# Patient Record
Sex: FEMALE | Race: WHITE | NOT HISPANIC OR LATINO | URBAN - METROPOLITAN AREA
[De-identification: names, ages, dates, MRNs, and addresses within clinical notes are randomized per-mention and may not be internally consistent; named-entity substitution may affect disease eponyms.]

---

## 2020-02-17 ENCOUNTER — INPATIENT (INPATIENT)
Facility: HOSPITAL | Age: 30
LOS: 2 days | Discharge: HOME | End: 2020-02-20
Payer: COMMERCIAL

## 2020-02-17 VITALS
TEMPERATURE: 98 F | SYSTOLIC BLOOD PRESSURE: 128 MMHG | DIASTOLIC BLOOD PRESSURE: 78 MMHG | HEART RATE: 63 BPM | WEIGHT: 145.95 LBS | HEIGHT: 63 IN | RESPIRATION RATE: 15 BRPM

## 2020-02-17 DIAGNOSIS — O26.893 OTHER SPECIFIED PREGNANCY RELATED CONDITIONS, THIRD TRIMESTER: ICD-10-CM

## 2020-02-17 DIAGNOSIS — O47.1 FALSE LABOR AT OR AFTER 37 COMPLETED WEEKS OF GESTATION: ICD-10-CM

## 2020-02-17 LAB
ABO RH CONFIRMATION: SIGNIFICANT CHANGE UP
ALLERGY+IMMUNOLOGY DIAG STUDY NOTE: SIGNIFICANT CHANGE UP
BASOPHILS # BLD AUTO: 0.04 K/UL — SIGNIFICANT CHANGE UP (ref 0–0.2)
BASOPHILS NFR BLD AUTO: 0.3 % — SIGNIFICANT CHANGE UP (ref 0–2)
BLD GP AB SCN SERPL QL: SIGNIFICANT CHANGE UP
DIR ANTIGLOB POLYSPECIFIC INTERPRETATION: SIGNIFICANT CHANGE UP
EOSINOPHIL # BLD AUTO: 0.02 K/UL — SIGNIFICANT CHANGE UP (ref 0–0.5)
EOSINOPHIL NFR BLD AUTO: 0.1 % — SIGNIFICANT CHANGE UP (ref 0–6)
HCT VFR BLD CALC: 42.8 % — SIGNIFICANT CHANGE UP (ref 34.5–45)
HGB BLD-MCNC: 14.4 G/DL — SIGNIFICANT CHANGE UP (ref 11.5–15.5)
HIV 1 & 2 AB SERPL IA.RAPID: SIGNIFICANT CHANGE UP
HIV 1+2 AB+HIV1 P24 AG SERPL QL IA: SIGNIFICANT CHANGE UP
IMM GRANULOCYTES NFR BLD AUTO: 0.9 % — SIGNIFICANT CHANGE UP (ref 0–1.5)
LYMPHOCYTES # BLD AUTO: 18.2 % — SIGNIFICANT CHANGE UP (ref 13–44)
LYMPHOCYTES # BLD AUTO: 2.49 K/UL — SIGNIFICANT CHANGE UP (ref 1–3.3)
MCHC RBC-ENTMCNC: 31.7 PG — SIGNIFICANT CHANGE UP (ref 27–34)
MCHC RBC-ENTMCNC: 33.6 GM/DL — SIGNIFICANT CHANGE UP (ref 32–36)
MCV RBC AUTO: 94.3 FL — SIGNIFICANT CHANGE UP (ref 80–100)
MONOCYTES # BLD AUTO: 0.82 K/UL — SIGNIFICANT CHANGE UP (ref 0–0.9)
MONOCYTES NFR BLD AUTO: 6 % — SIGNIFICANT CHANGE UP (ref 2–14)
NEUTROPHILS # BLD AUTO: 10.22 K/UL — HIGH (ref 1.8–7.4)
NEUTROPHILS NFR BLD AUTO: 74.5 % — SIGNIFICANT CHANGE UP (ref 43–77)
PLATELET # BLD AUTO: 186 K/UL — SIGNIFICANT CHANGE UP (ref 150–400)
RBC # BLD: 4.54 M/UL — SIGNIFICANT CHANGE UP (ref 3.8–5.2)
RBC # FLD: 12.1 % — SIGNIFICANT CHANGE UP (ref 10.3–14.5)
T PALLIDUM AB TITR SER: NEGATIVE — SIGNIFICANT CHANGE UP
WBC # BLD: 13.71 K/UL — HIGH (ref 3.8–10.5)
WBC # FLD AUTO: 13.71 K/UL — HIGH (ref 3.8–10.5)

## 2020-02-17 PROCEDURE — 86077 PHYS BLOOD BANK SERV XMATCH: CPT

## 2020-02-17 RX ORDER — SODIUM CHLORIDE 9 MG/ML
1000 INJECTION, SOLUTION INTRAVENOUS
Refills: 0 | Status: DISCONTINUED | OUTPATIENT
Start: 2020-02-17 | End: 2020-02-18

## 2020-02-17 RX ORDER — OXYTOCIN 10 UNIT/ML
2 VIAL (ML) INJECTION
Qty: 30 | Refills: 0 | Status: DISCONTINUED | OUTPATIENT
Start: 2020-02-17 | End: 2020-02-18

## 2020-02-17 RX ORDER — CITRIC ACID/SODIUM CITRATE 300-500 MG
30 SOLUTION, ORAL ORAL ONCE
Refills: 0 | Status: COMPLETED | OUTPATIENT
Start: 2020-02-17 | End: 2020-02-17

## 2020-02-17 RX ORDER — OXYTOCIN 10 UNIT/ML
333.33 VIAL (ML) INJECTION
Qty: 20 | Refills: 0 | Status: COMPLETED | OUTPATIENT
Start: 2020-02-17 | End: 2020-02-17

## 2020-02-17 RX ORDER — OXYTOCIN 10 UNIT/ML
2 VIAL (ML) INJECTION
Qty: 30 | Refills: 0 | Status: DISCONTINUED | OUTPATIENT
Start: 2020-02-17 | End: 2020-02-20

## 2020-02-17 RX ADMIN — Medication 2 MILLIUNIT(S)/MIN: at 10:39

## 2020-02-17 RX ADMIN — SODIUM CHLORIDE 125 MILLILITER(S): 9 INJECTION, SOLUTION INTRAVENOUS at 10:00

## 2020-02-17 RX ADMIN — Medication 2 MILLIUNIT(S)/MIN: at 22:05

## 2020-02-17 NOTE — CHART NOTE - NSCHARTNOTEFT_GEN_A_CORE
Patient seen and examined at bedside.  She is comfortable with her epidural.  No complaints.       Vital Signs Last 24 Hrs  T(C): 36.5 (17 Feb 2020 12:47), Max: 36.8 (17 Feb 2020 09:31)  T(F): 97.7 (17 Feb 2020 12:47), Max: 98.2 (17 Feb 2020 09:31)  HR: 71 (17 Feb 2020 14:58) (63 - 100)  BP: 119/58 (17 Feb 2020 15:02) (107/52 - 137/79)  BP(mean): --  RR: 15 (17 Feb 2020 12:47) (15 - 15)  SpO2: 100% (17 Feb 2020 14:58) (98% - 100%)    FETAL HEART RATE: 120, moderate variability, + accels, no decels    [place]: ctx q 2-4 minutes    Pitocin at 8 milliunits/ min    CERVICAL EXAM: 1/50/-3    Continue current care  Adela Prescott DO

## 2020-02-17 NOTE — CHART NOTE - NSCHARTNOTEFT_GEN_A_CORE
02-17-20 @ 21:34  Patient was evaluated at bedside.  Patient sitting up in bed after an episode of nausea and vomiting   Patient denies any pelvic pressure/discomfort.    ICU Vital Signs Last 24 Hrs  T(C): 37.1 (17 Feb 2020 18:02), Max: 37.1 (17 Feb 2020 18:02)  T(F): 98.78 (17 Feb 2020 18:02), Max: 98.78 (17 Feb 2020 18:02)  HR: 64 (17 Feb 2020 21:33) (54 - 138)  BP: 130/65 (17 Feb 2020 21:27) (107/52 - 147/73)  RR: 15 (17 Feb 2020 16:12) (15 - 15)  SpO2: 100% (17 Feb 2020 21:28) (90% - 100%)    FHT: 125 bpm, periods of minimal variability + accels 3min decel noted   [place]: Ctxs every 2-3 minutes.  SVE: Deferred    PLAN:  3-4 min decelerations with return to baseline after repositioning and O2 supplementation  Pitocin discontinued at bedside; will restart now that tracing is back to baseline  Continuous FHT/[place]  Maternal/fetal status reassuring  Will continue to reassess prn.

## 2020-02-17 NOTE — CHART NOTE - NSCHARTNOTEFT_GEN_A_CORE
Patient seen and examined at bedside.  She is comfortable with epidural.  She did vomit once.  + bloody show.     Vital Signs Last 24 Hrs  T(C): 36.8 (17 Feb 2020 21:56), Max: 37.1 (17 Feb 2020 18:02)  T(F): 98.24 (17 Feb 2020 21:56), Max: 98.78 (17 Feb 2020 18:02)  HR: 59 (17 Feb 2020 22:23) (54 - 138)  BP: 129/66 (17 Feb 2020 22:13) (107/52 - 147/73)  BP(mean): --  RR: 18 (17 Feb 2020 21:56) (15 - 18)  SpO2: 100% (17 Feb 2020 22:23) (90% - 100%)    FETAL HEART RATE: 120, moderate variability, + accels, no decels    [place]: ctx q 2-3 minutes    Pitocin at 6 milliunits/ min    CERVICAL EXAM: 9/100/0    Continue current care  Adela Prescott DO

## 2020-02-17 NOTE — OB PROVIDER H&P - HISTORY OF PRESENT ILLNESS
30y year old G1 at 37w6d  consistent with presents for PROM since this morning.  Pt states that her prenatal course is uncomplicated, denies headaches, abnormal blood pressures, inpatient admissions or major health issues with this pregnancy.  Denies: vaginal bleeding, or contractions.  +fetal movement    Pt reports that she wants epidural anesthesia with for pain control when she feels more uncomfortable    PMH: none  PSH: Back surgery  OBH: regular cycles, no history of STI  Alllergy: NKDA  meds: PNV  Preg complications: none    T(C): 36.8 (20 @ 09:31), Max: 36.8 (20 @ 09:31)  HR: 63 (20 @ 09:31) (63 - 63)  BP: 128/78 (20 @ 09:31) (128/78 - 128/78)  RR: 15 (20 @ 09:31) (15 - 15)  Gen: NAD  Abd: softly distended, gravid, + BS   Pelvic:  0.5cm/20% effaced/-3station grossly ruptured with positive pooling and positive nitrazine  Tracin, moderate variability, + accelerations, - decelerations

## 2020-02-17 NOTE — OB PROVIDER H&P - ASSESSMENT
30y year old G1 at 37w6d  consistent with presents for PROM since this morning.  -admit to L&D  -routine labs  -Pitocin  -IV fluids  -discussed pain management: patient wants epidural   -discussed with Dr. Prescott

## 2020-02-17 NOTE — CHART NOTE - NSCHARTNOTEFT_GEN_A_CORE
Patient seen and examined at bedside.  She is comfortable with her epidural.  No complaints.        Vital Signs Last 24 Hrs  T(C): 36.7 (17 Feb 2020 16:12), Max: 36.8 (17 Feb 2020 09:31)  T(F): 98.06 (17 Feb 2020 16:12), Max: 98.2 (17 Feb 2020 09:31)  HR: 59 (17 Feb 2020 17:58) (54 - 111)  BP: 117/59 (17 Feb 2020 17:58) (107/52 - 147/73)  BP(mean): --  RR: 15 (17 Feb 2020 16:12) (15 - 15)  SpO2: 98% (17 Feb 2020 17:58) (90% - 100%)    FETAL HEART RATE: 135, moderate variability, + accels, no decels    [place]: ctx q 2 minutes    Pitocin at 12 milliunits/ min    CERVICAL EXAM: 3/80/-2    Continue current care  Adela Prescott DO

## 2020-02-17 NOTE — OB PROVIDER H&P - ATTENDING COMMENTS
30 year old female  at 37+ weeks with ROM.  Agree with details of resident note.  VSS.  FHT category 1.  No ctx on toco.  VE: 3.  GBS negative.  Plan for pitocin augmentation.  Pain management as needed. Consent obtained.

## 2020-02-18 RX ORDER — KETOROLAC TROMETHAMINE 30 MG/ML
30 SYRINGE (ML) INJECTION ONCE
Refills: 0 | Status: DISCONTINUED | OUTPATIENT
Start: 2020-02-18 | End: 2020-02-18

## 2020-02-18 RX ORDER — LANOLIN
1 OINTMENT (GRAM) TOPICAL EVERY 6 HOURS
Refills: 0 | Status: DISCONTINUED | OUTPATIENT
Start: 2020-02-18 | End: 2020-02-20

## 2020-02-18 RX ORDER — GLYCERIN ADULT
1 SUPPOSITORY, RECTAL RECTAL AT BEDTIME
Refills: 0 | Status: DISCONTINUED | OUTPATIENT
Start: 2020-02-18 | End: 2020-02-20

## 2020-02-18 RX ORDER — SIMETHICONE 80 MG/1
80 TABLET, CHEWABLE ORAL EVERY 4 HOURS
Refills: 0 | Status: DISCONTINUED | OUTPATIENT
Start: 2020-02-18 | End: 2020-02-20

## 2020-02-18 RX ORDER — MAGNESIUM HYDROXIDE 400 MG/1
30 TABLET, CHEWABLE ORAL
Refills: 0 | Status: DISCONTINUED | OUTPATIENT
Start: 2020-02-18 | End: 2020-02-20

## 2020-02-18 RX ORDER — OXYCODONE HYDROCHLORIDE 5 MG/1
5 TABLET ORAL
Refills: 0 | Status: DISCONTINUED | OUTPATIENT
Start: 2020-02-18 | End: 2020-02-20

## 2020-02-18 RX ORDER — TETANUS TOXOID, REDUCED DIPHTHERIA TOXOID AND ACELLULAR PERTUSSIS VACCINE, ADSORBED 5; 2.5; 8; 8; 2.5 [IU]/.5ML; [IU]/.5ML; UG/.5ML; UG/.5ML; UG/.5ML
0.5 SUSPENSION INTRAMUSCULAR ONCE
Refills: 0 | Status: COMPLETED | OUTPATIENT
Start: 2020-02-18 | End: 2020-02-20

## 2020-02-18 RX ORDER — DIBUCAINE 1 %
1 OINTMENT (GRAM) RECTAL EVERY 6 HOURS
Refills: 0 | Status: DISCONTINUED | OUTPATIENT
Start: 2020-02-18 | End: 2020-02-20

## 2020-02-18 RX ORDER — OXYCODONE HYDROCHLORIDE 5 MG/1
5 TABLET ORAL ONCE
Refills: 0 | Status: DISCONTINUED | OUTPATIENT
Start: 2020-02-18 | End: 2020-02-20

## 2020-02-18 RX ORDER — OXYTOCIN 10 UNIT/ML
333.33 VIAL (ML) INJECTION
Qty: 20 | Refills: 0 | Status: DISCONTINUED | OUTPATIENT
Start: 2020-02-18 | End: 2020-02-20

## 2020-02-18 RX ORDER — BENZOCAINE 10 %
1 GEL (GRAM) MUCOUS MEMBRANE EVERY 6 HOURS
Refills: 0 | Status: DISCONTINUED | OUTPATIENT
Start: 2020-02-18 | End: 2020-02-20

## 2020-02-18 RX ORDER — PRAMOXINE HYDROCHLORIDE 150 MG/15G
1 AEROSOL, FOAM RECTAL EVERY 4 HOURS
Refills: 0 | Status: DISCONTINUED | OUTPATIENT
Start: 2020-02-18 | End: 2020-02-20

## 2020-02-18 RX ORDER — IBUPROFEN 200 MG
600 TABLET ORAL EVERY 6 HOURS
Refills: 0 | Status: DISCONTINUED | OUTPATIENT
Start: 2020-02-18 | End: 2020-02-20

## 2020-02-18 RX ORDER — SODIUM CHLORIDE 9 MG/ML
3 INJECTION INTRAMUSCULAR; INTRAVENOUS; SUBCUTANEOUS EVERY 8 HOURS
Refills: 0 | Status: DISCONTINUED | OUTPATIENT
Start: 2020-02-18 | End: 2020-02-20

## 2020-02-18 RX ORDER — TETANUS TOXOID, REDUCED DIPHTHERIA TOXOID AND ACELLULAR PERTUSSIS VACCINE, ADSORBED 5; 2.5; 8; 8; 2.5 [IU]/.5ML; [IU]/.5ML; UG/.5ML; UG/.5ML; UG/.5ML
0.5 SUSPENSION INTRAMUSCULAR ONCE
Refills: 0 | Status: COMPLETED | OUTPATIENT
Start: 2020-02-18

## 2020-02-18 RX ORDER — DIPHENHYDRAMINE HCL 50 MG
25 CAPSULE ORAL EVERY 6 HOURS
Refills: 0 | Status: DISCONTINUED | OUTPATIENT
Start: 2020-02-18 | End: 2020-02-20

## 2020-02-18 RX ORDER — IBUPROFEN 200 MG
600 TABLET ORAL EVERY 6 HOURS
Refills: 0 | Status: COMPLETED | OUTPATIENT
Start: 2020-02-18 | End: 2021-01-16

## 2020-02-18 RX ORDER — HYDROCORTISONE 1 %
1 OINTMENT (GRAM) TOPICAL EVERY 6 HOURS
Refills: 0 | Status: DISCONTINUED | OUTPATIENT
Start: 2020-02-18 | End: 2020-02-20

## 2020-02-18 RX ORDER — AER TRAVELER 0.5 G/1
1 SOLUTION RECTAL; TOPICAL EVERY 4 HOURS
Refills: 0 | Status: DISCONTINUED | OUTPATIENT
Start: 2020-02-18 | End: 2020-02-20

## 2020-02-18 RX ORDER — ACETAMINOPHEN 500 MG
975 TABLET ORAL
Refills: 0 | Status: DISCONTINUED | OUTPATIENT
Start: 2020-02-18 | End: 2020-02-20

## 2020-02-18 RX ADMIN — Medication 30 MILLIGRAM(S): at 01:58

## 2020-02-18 RX ADMIN — Medication 975 MILLIGRAM(S): at 21:56

## 2020-02-18 RX ADMIN — SODIUM CHLORIDE 3 MILLILITER(S): 9 INJECTION INTRAMUSCULAR; INTRAVENOUS; SUBCUTANEOUS at 06:16

## 2020-02-18 RX ADMIN — Medication 975 MILLIGRAM(S): at 09:01

## 2020-02-18 RX ADMIN — Medication 1000 MILLIUNIT(S)/MIN: at 01:35

## 2020-02-18 RX ADMIN — Medication 600 MILLIGRAM(S): at 19:10

## 2020-02-18 RX ADMIN — Medication 975 MILLIGRAM(S): at 17:40

## 2020-02-18 RX ADMIN — Medication 975 MILLIGRAM(S): at 20:40

## 2020-02-18 RX ADMIN — Medication 600 MILLIGRAM(S): at 13:20

## 2020-02-18 RX ADMIN — Medication 1 TABLET(S): at 12:21

## 2020-02-18 RX ADMIN — Medication 600 MILLIGRAM(S): at 18:12

## 2020-02-18 RX ADMIN — Medication 975 MILLIGRAM(S): at 16:40

## 2020-02-18 RX ADMIN — Medication 975 MILLIGRAM(S): at 09:49

## 2020-02-18 RX ADMIN — Medication 600 MILLIGRAM(S): at 12:22

## 2020-02-18 NOTE — OB PROVIDER DELIVERY SUMMARY - NSPROVIDERDELIVERYNOTE_OBGYN_ALL_OB_FT
Procedure: Normal vaginal delivery   Findings: Viable Male infant delivered in cephalic presentation at 01:20, placenta delivered at 01:25. Apgar 9/9. Weight Pending skin to skin  Laceration: 2nd degree perineal  Repair: 2-0 & 3-0 Chromic   EBL: 250 cc  Complications: Nuchal x 1      Patient felt rectal pressure and was found to be fully dilated, 0 station. She pushed effectively for 90 minutes. In conjunction with maternal effort, she delivered a viable Male infant . Vertex delivered without difficulty, Nuchal cord noted x 1, Anterior shoulders then delivered without difficulty. Placenta delivered spontaneously and intact. Pitocin started. Excellent hemostasis was achieved. Perineum and vagina were inspected and 2nd degree laceration was noted with 3-0 & 2-0 chromic suture. Procedure: Normal vaginal delivery   Findings: Viable Male infant delivered in cephalic presentation at 01:20, placenta delivered at 01:25. Apgar 9/9. Weight Pending skin to skin  Laceration: 2nd degree perineal  Repair: 2-0 & 3-0 Chromic   EBL: 250 cc  Complications: Nuchal x 1      Patient felt rectal pressure and was found to be fully dilated, 0 station. She pushed effectively for 90 minutes. In conjunction with maternal effort, she delivered a viable Male infant . Vertex delivered without difficulty, Nuchal cord noted x 1 reduced at perineum. Anterior shoulders then delivered without difficulty. No lateral traction used.  Placenta delivered spontaneously and intact. Pitocin started. Fundus firm.  Uterus, cervix, perineum and vagina were inspected and 2nd degree laceration was repaired in a normal fashion with 3-0 & 2-0 chromic suture.  Good hemostasis noted.  Patient and infant stable.

## 2020-02-18 NOTE — OB RN DELIVERY SUMMARY - NS_SEPSISRSKCALC_OBGYN_ALL_OB_FT
EOS calculated successfully. EOS Risk Factor: 0.5/1000 live births ([facility] national incidence); GA=38w;Temp=98.78; ROM=17.833; GBS='Negative'; Antibiotics='No antibiotics or any antibiotics < 2 hrs prior to birth'

## 2020-02-18 NOTE — CHART NOTE - NSCHARTNOTEFT_GEN_A_CORE
29 yo female, , admitted to labor and delivery on 2020 at 37w6d GA. Patient is blood group A Rh negative, and she received RhIg on 19. Blood sample on 19 has a positive antibody screen, with anti-D identified in patient's plasma. Anti-D is most likely due to prior administration of RhIg, and as such should have no negative effects on current or future pregnancy with an Rh(D) positive fetus.

## 2020-02-19 LAB
FETAL SCREEN: SIGNIFICANT CHANGE UP
HCT VFR BLD CALC: 38.8 % — SIGNIFICANT CHANGE UP (ref 34.5–45)
HGB BLD-MCNC: 12.7 G/DL — SIGNIFICANT CHANGE UP (ref 11.5–15.5)

## 2020-02-19 RX ORDER — FERROUS SULFATE 325(65) MG
1 TABLET ORAL
Qty: 60 | Refills: 0
Start: 2020-02-19 | End: 2020-03-19

## 2020-02-19 RX ORDER — IBUPROFEN 200 MG
1 TABLET ORAL
Qty: 30 | Refills: 0
Start: 2020-02-19

## 2020-02-19 RX ORDER — SENNA PLUS 8.6 MG/1
2 TABLET ORAL AT BEDTIME
Refills: 0 | Status: DISCONTINUED | OUTPATIENT
Start: 2020-02-19 | End: 2020-02-20

## 2020-02-19 RX ORDER — DOCUSATE SODIUM 100 MG
1 CAPSULE ORAL
Qty: 60 | Refills: 0
Start: 2020-02-19 | End: 2020-03-19

## 2020-02-19 RX ADMIN — SENNA PLUS 2 TABLET(S): 8.6 TABLET ORAL at 22:53

## 2020-02-19 RX ADMIN — Medication 975 MILLIGRAM(S): at 10:00

## 2020-02-19 RX ADMIN — Medication 600 MILLIGRAM(S): at 12:39

## 2020-02-19 RX ADMIN — Medication 600 MILLIGRAM(S): at 06:10

## 2020-02-19 RX ADMIN — Medication 600 MILLIGRAM(S): at 13:20

## 2020-02-19 RX ADMIN — Medication 975 MILLIGRAM(S): at 20:57

## 2020-02-19 RX ADMIN — Medication 975 MILLIGRAM(S): at 16:00

## 2020-02-19 RX ADMIN — Medication 600 MILLIGRAM(S): at 17:58

## 2020-02-19 RX ADMIN — SODIUM CHLORIDE 3 MILLILITER(S): 9 INJECTION INTRAMUSCULAR; INTRAVENOUS; SUBCUTANEOUS at 06:11

## 2020-02-19 RX ADMIN — Medication 600 MILLIGRAM(S): at 00:04

## 2020-02-19 RX ADMIN — Medication 600 MILLIGRAM(S): at 18:53

## 2020-02-19 RX ADMIN — Medication 975 MILLIGRAM(S): at 09:20

## 2020-02-19 RX ADMIN — Medication 975 MILLIGRAM(S): at 15:14

## 2020-02-19 NOTE — DISCHARGE NOTE OB - MEDICATION SUMMARY - MEDICATIONS TO TAKE
I will START or STAY ON the medications listed below when I get home from the hospital:    ibuprofen 600 mg oral tablet  -- 1 tab(s) by mouth every 6 hours MDD:4  -- Do not take this drug if you are pregnant.  It is very important that you take or use this exactly as directed.  Do not skip doses or discontinue unless directed by your doctor.  May cause drowsiness or dizziness.  Obtain medical advice before taking any non-prescription drugs as some may affect the action of this medication.  Take with food or milk.    -- Indication: For PAIN    Prenatal Multivitamins with Folic Acid 1 mg oral tablet  -- 1 tab(s) by mouth once a day  -- Indication: For GENERAL HEALTH    ferrous sulfate 325 mg (65 mg elemental iron) oral delayed release tablet  -- 1 tab(s) by mouth 2 times a day   -- May discolor urine or feces.  Swallow whole.  Do not crush.    -- Indication: For ANEMIA    docusate sodium 100 mg oral tablet  -- 1 tab(s) by mouth 2 times a day   -- Medication should be taken with plenty of water.    -- Indication: For CONSTIPATION

## 2020-02-19 NOTE — PROGRESS NOTE ADULT - ATTENDING COMMENTS
As above, pt doing well.  VSS, afebrile.  Breasts soft, NT, NE  Abd soft, NT, fundus firm    PPD#1 S/P  stable.

## 2020-02-19 NOTE — DISCHARGE NOTE OB - CARE PROVIDER_API CALL
Adela Prescott (DO)  Obstetrics and Gynecology  [address] [place] [address]  Phone: [phone]  Fax: [phone]  Follow Up Time: Ronna Nugent)  Obstetrics and Gynecology  [address] [place] [address]  Phone: [phone]  Fax: [phone]  Follow Up Time:

## 2020-02-19 NOTE — DISCHARGE NOTE OB - PATIENT PORTAL LINK FT
You can access the FollowMyHealth Patient Portal offered by [facility] by registering at the following website: [email or web address]. By joining [employer]’s FollowMyHealth portal, you will also be able to view your health information using other applications (apps) compatible with our system.

## 2020-02-19 NOTE — DISCHARGE NOTE OB - MATERIALS PROVIDED
Vaccinations/Breastfeeding Mother’s Support Group Information/Guide to Postpartum Care/Tdap Vaccination (VIS Pub Date: 2012)/[place]  Immunization Record/Breastfeeding Log/Back To Sleep Handout/Prescription for Breast Pump/[facility]  Screening Program/Bottle Feeding Log/Shaken Baby Prevention Handout/Birth Certificate Instructions/[facility] Hearing Screen Program/MMR Vaccination (VIS Pub Date: 2012)/Discharge Medication Information for Patients and Families Pocket Guide

## 2020-02-19 NOTE — DISCHARGE NOTE OB - HOSPITAL COURSE
She is a 29 yo now  who presented at 38 weeks due to rupture of membranes and had a normal delivery. She had a normal postpartum course and was discharged home in stable condition on postpartum day 2.

## 2020-02-19 NOTE — PROGRESS NOTE ADULT - PROBLEM SELECTOR PLAN 1
Patient feels well  Hgb:  14.4  --> F/U AM labs  Continue the current pain medication  Encourage  Ambulation  Encourage regular diet  DVT ppx: SCDs only when not ambulating  She is stable, tolerates a diet   Continue routine postpartum care

## 2020-02-19 NOTE — PROGRESS NOTE ADULT - SUBJECTIVE AND OBJECTIVE BOX
MRN: [id number]  Date Admitted: 20  Location: [facility] 2EST 2005 ([facility] 2EST)  Attending: Ronna Nugent      Post Partum Progress Note: Vaginal delivery     PARK WEN is a 30y  s/p vaginal delivery @ 38wks 2/2 to PROM, PPD #1, MALE INFANT    SUBJECTIVE:  Patient was seen and examined at bedside.   Reports feeling well this AM.   Pain is well controlled with PRN pain medication.   Tolerating a regular diet. Denies nausea/vomiting.   + Flatus. No BM.   Voiding spontaneously. Ambulating without assistance.   She is reporting moderate difficulty with breastfeeding; states that she feels that she is not producing enough colostrum/infant isn't latching long enough  Otherwise, no additional complaints. Denies fevers, chills, SOB, CP, HA, vision changes or calf pain.      OBJECTIVE:  Physical exam:  General: AOx3, NAD.  Heart: RRR. S1S2.  Lungs: CTABL. Good airflow bilaterally.   Abdomen: +BS, Soft, appropriately tender, nondistended, no guarding or rebound tenderness, firm uterine fundus at umbilicus  VE: +Lochia  Ext: No DVT signs, warm extremities.    Vital Signs Last 24 Hrs  T(C): 36.7 (2020 20:00), Max: 36.7 (2020 20:00)  T(F): 98 (2020 20:00), Max: 98 (2020 20:00)  HR: 68 (2020 20:00) (61 - 68)  BP: 111/73 (2020 20:00) (111/73 - 115/68)  RR: 18 (2020 20:00) (18 - 18)  SpO2: 97% (2020 20:00) (97% - 97%)    LABS:                        14.4   13.71 )-----------( 186      ( 2020 10:18 )             42.8

## 2020-02-19 NOTE — DISCHARGE NOTE OB - SEVERE ABDOMINAL/VAGINAL AND/OR RECTAL PAIN
H Plasty Text: Given the location of the defect, shape of the defect and the proximity to free margins a H-plasty was deemed most appropriate for repair.  Using a sterile surgical marker, the appropriate advancement arms of the H-plasty were drawn incorporating the defect and placing the expected incisions within the relaxed skin tension lines where possible. The area thus outlined was incised deep to adipose tissue with a #15 scalpel blade. The skin margins were undermined to an appropriate distance in all directions utilizing iris scissors.  The opposing advancement arms were then advanced into place in opposite direction and anchored with interrupted buried subcutaneous sutures. Complex Repair And Transposition Flap Text: The defect edges were debeveled with a #15 scalpel blade.  The primary defect was closed partially with a complex linear closure.  Given the location of the remaining defect, shape of the defect and the proximity to free margins a transposition flap was deemed most appropriate for complete closure of the defect.  Using a sterile surgical marker, an appropriate advancement flap was drawn incorporating the defect and placing the expected incisions within the relaxed skin tension lines where possible.    The area thus outlined was incised deep to adipose tissue with a #15 scalpel blade.  The skin margins were undermined to an appropriate distance in all directions utilizing iris scissors. Excision Depth: adipose tissue Medical Necessity Information: It is in your best interest to select a reason for this procedure from the list below. All of these items fulfill various CMS LCD requirements except lesion extends to a margin. Hatchet Flap Text: The defect edges were debeveled with a #15 scalpel blade.  Given the location of the defect, shape of the defect and the proximity to free margins a hatchet flap was deemed most appropriate.  Using a sterile surgical marker, an appropriate hatchet flap was drawn incorporating the defect and placing the expected incisions within the relaxed skin tension lines where possible.    The area thus outlined was incised deep to adipose tissue with a #15 scalpel blade.  The skin margins were undermined to an appropriate distance in all directions utilizing iris scissors. Skin Substitute Text: The defect edges were debeveled with a #15 scalpel blade.  Given the location of the defect, shape of the defect and the proximity to free margins a skin substitute graft was deemed most appropriate.  The graft material was trimmed to fit the size of the defect. The graft was then placed in the primary defect and oriented appropriately. Bi-Rhombic Flap Text: The defect edges were debeveled with a #15 scalpel blade.  Given the location of the defect and the proximity to free margins a bi-rhombic flap was deemed most appropriate.  Using a sterile surgical marker, an appropriate rhombic flap was drawn incorporating the defect. The area thus outlined was incised deep to adipose tissue with a #15 scalpel blade.  The skin margins were undermined to an appropriate distance in all directions utilizing iris scissors. Show Primary And Secondary Defect Sizes Variable (Do Not Hide If You Perform Flaps Or Graft Closures): Yes Modified Advancement Flap Text: The defect edges were debeveled with a #15 scalpel blade.  Given the location of the defect, shape of the defect and the proximity to free margins a modified advancement flap was deemed most appropriate.  Using a sterile surgical marker, an appropriate advancement flap was drawn incorporating the defect and placing the expected incisions within the relaxed skin tension lines where possible.    The area thus outlined was incised deep to adipose tissue with a #15 scalpel blade.  The skin margins were undermined to an appropriate distance in all directions utilizing iris scissors. Ear Star Wedge Flap Text: The defect edges were debeveled with a #15 blade scalpel.  Given the location of the defect and the proximity to free margins (helical rim) an ear star wedge flap was deemed most appropriate.  Using a sterile surgical marker, the appropriate flap was drawn incorporating the defect and placing the expected incisions between the helical rim and antihelix where possible.  The area thus outlined was incised through and through with a #15 scalpel blade. Complex Repair And Double M Plasty Text: The defect edges were debeveled with a #15 scalpel blade.  The primary defect was closed partially with a complex linear closure.  Given the location of the remaining defect, shape of the defect and the proximity to free margins a double M plasty was deemed most appropriate for complete closure of the defect.  Using a sterile surgical marker, an appropriate advancement flap was drawn incorporating the defect and placing the expected incisions within the relaxed skin tension lines where possible.    The area thus outlined was incised deep to adipose tissue with a #15 scalpel blade.  The skin margins were undermined to an appropriate distance in all directions utilizing iris scissors. Posterior Auricular Interpolation Flap Text: A decision was made to reconstruct the defect utilizing an interpolation axial flap and a staged reconstruction.  A telfa template was made of the defect.  This telfa template was then used to outline the posterior auricular interpolation flap.  The donor area for the pedicle flap was then injected with anesthesia.  The flap was excised through the skin and subcutaneous tissue down to the layer of the underlying musculature.  The pedicle flap was carefully excised within this deep plane to maintain its blood supply.  The edges of the donor site were undermined.   The donor site was closed in a primary fashion.  The pedicle was then rotated into position and sutured.  Once the tube was sutured into place, adequate blood supply was confirmed with blanching and refill.  The pedicle was then wrapped with xeroform gauze and dressed appropriately with a telfa and gauze bandage to ensure continued blood supply and protect the attached pedicle. Excision Method: Elliptical Bill For Surgical Tray: no Dressing: pressure dressing with telfa Primary Defect Length (In Cm): 0 Alar Island Pedicle Flap Text: The defect edges were debeveled with a #15 scalpel blade.  Given the location of the defect, shape of the defect and the proximity to the alar rim an island pedicle advancement flap was deemed most appropriate.  Using a sterile surgical marker, an appropriate advancement flap was drawn incorporating the defect, outlining the appropriate donor tissue and placing the expected incisions within the nasal ala running parallel to the alar rim. The area thus outlined was incised with a #15 scalpel blade.  The skin margins were undermined minimally to an appropriate distance in all directions around the primary defect and laterally outward around the island pedicle utilizing iris scissors.  There was minimal undermining beneath the pedicle flap. Island Pedicle Flap With Canthal Suspension Text: The defect edges were debeveled with a #15 scalpel blade.  Given the location of the defect, shape of the defect and the proximity to free margins an island pedicle advancement flap was deemed most appropriate.  Using a sterile surgical marker, an appropriate advancement flap was drawn incorporating the defect, outlining the appropriate donor tissue and placing the expected incisions within the relaxed skin tension lines where possible. The area thus outlined was incised deep to adipose tissue with a #15 scalpel blade.  The skin margins were undermined to an appropriate distance in all directions around the primary defect and laterally outward around the island pedicle utilizing iris scissors.  There was minimal undermining beneath the pedicle flap. A suspension suture was placed in the canthal tendon to prevent tension and prevent ectropion. Size Of Lesion In Cm: 0.6 Complex Repair And Single Advancement Flap Text: The defect edges were debeveled with a #15 scalpel blade.  The primary defect was closed partially with a complex linear closure.  Given the location of the remaining defect, shape of the defect and the proximity to free margins a single advancement flap was deemed most appropriate for complete closure of the defect.  Using a sterile surgical marker, an appropriate advancement flap was drawn incorporating the defect and placing the expected incisions within the relaxed skin tension lines where possible.    The area thus outlined was incised deep to adipose tissue with a #15 scalpel blade.  The skin margins were undermined to an appropriate distance in all directions utilizing iris scissors. Post-Care Instructions: I reviewed with the patient in detail post-care instructions. Patient is not to engage in any heavy lifting, exercise, or swimming for the next 14 days. Should the patient develop any fevers, chills, bleeding, severe pain patient will contact the office immediately. Estimated Blood Loss (Cc): minimal Complex Repair And O-T Advancement Flap Text: The defect edges were debeveled with a #15 scalpel blade.  The primary defect was closed partially with a complex linear closure.  Given the location of the remaining defect, shape of the defect and the proximity to free margins an O-T advancement flap was deemed most appropriate for complete closure of the defect.  Using a sterile surgical marker, an appropriate advancement flap was drawn incorporating the defect and placing the expected incisions within the relaxed skin tension lines where possible.    The area thus outlined was incised deep to adipose tissue with a #15 scalpel blade.  The skin margins were undermined to an appropriate distance in all directions utilizing iris scissors. Bilobed Flap Text: The defect edges were debeveled with a #15 scalpel blade.  Given the location of the defect and the proximity to free margins a bilobe flap was deemed most appropriate.  Using a sterile surgical marker, an appropriate bilobe flap drawn around the defect.    The area thus outlined was incised deep to adipose tissue with a #15 scalpel blade.  The skin margins were undermined to an appropriate distance in all directions utilizing iris scissors. Complex Repair Preamble Text (Leave Blank If You Do Not Want): Extensive wide undermining was performed. Eliptical Excision Additional Text (Leave Blank If You Do Not Want): The margin was drawn around the clinically apparent lesion.  An elliptical shape was then drawn on the skin incorporating the lesion and margins.  Incisions were then made along these lines to the appropriate tissue plane and the lesion was extirpated. Cheek-To-Nose Interpolation Flap Text: A decision was made to reconstruct the defect utilizing an interpolation axial flap and a staged reconstruction.  A telfa template was made of the defect.  This telfa template was then used to outline the Cheek-To-Nose Interpolation flap.  The donor area for the pedicle flap was then injected with anesthesia.  The flap was excised through the skin and subcutaneous tissue down to the layer of the underlying musculature.  The interpolation flap was carefully excised within this deep plane to maintain its blood supply.  The edges of the donor site were undermined.   The donor site was closed in a primary fashion.  The pedicle was then rotated into position and sutured.  Once the tube was sutured into place, adequate blood supply was confirmed with blanching and refill.  The pedicle was then wrapped with xeroform gauze and dressed appropriately with a telfa and gauze bandage to ensure continued blood supply and protect the attached pedicle. Ftsg Text: The defect edges were debeveled with a #15 scalpel blade.  Given the location of the defect, shape of the defect and the proximity to free margins a full thickness skin graft was deemed most appropriate.  Using a sterile surgical marker, the primary defect shape was transferred to the donor site. The area thus outlined was incised deep to adipose tissue with a #15 scalpel blade.  The harvested graft was then trimmed of adipose tissue until only dermis and epidermis was left.  The skin margins of the secondary defect were undermined to an appropriate distance in all directions utilizing iris scissors.  The secondary defect was closed with interrupted buried subcutaneous sutures.  The skin edges were then re-apposed with running  sutures.  The skin graft was then placed in the primary defect and oriented appropriately. Fusiform Excision Additional Text (Leave Blank If You Do Not Want): The margin was drawn around the clinically apparent lesion.  A fusiform shape was then drawn on the skin incorporating the lesion and margins.  Incisions were then made along these lines to the appropriate tissue plane and the lesion was extirpated. Paramedian Forehead Flap Text: A decision was made to reconstruct the defect utilizing an interpolation axial flap and a staged reconstruction.  A telfa template was made of the defect.  This telfa template was then used to outline the paramedian forehead pedicle flap.  The donor area for the pedicle flap was then injected with anesthesia.  The flap was excised through the skin and subcutaneous tissue down to the layer of the underlying musculature.  The pedicle flap was carefully excised within this deep plane to maintain its blood supply.  The edges of the donor site were undermined.   The donor site was closed in a primary fashion.  The pedicle was then rotated into position and sutured.  Once the tube was sutured into place, adequate blood supply was confirmed with blanching and refill.  The pedicle was then wrapped with xeroform gauze and dressed appropriately with a telfa and gauze bandage to ensure continued blood supply and protect the attached pedicle. Rhombic Flap Text: The defect edges were debeveled with a #15 scalpel blade.  Given the location of the defect and the proximity to free margins a rhombic flap was deemed most appropriate.  Using a sterile surgical marker, an appropriate rhombic flap was drawn incorporating the defect.    The area thus outlined was incised deep to adipose tissue with a #15 scalpel blade.  The skin margins were undermined to an appropriate distance in all directions utilizing iris scissors. Graft Donor Site Bandage (Optional-Leave Blank If You Don't Want In Note): Steri-strips and a pressure bandage were applied to the donor site. Slit Excision Additional Text (Leave Blank If You Do Not Want): A linear line was drawn on the skin overlying the lesion. An incision was made slowly until the lesion was visualized.  Once visualized, the lesion was removed with blunt dissection. Complex Repair And Tissue Cultured Epidermal Autograft Text: The defect edges were debeveled with a #15 scalpel blade.  The primary defect was closed partially with a complex linear closure.  Given the location of the defect, shape of the defect and the proximity to free margins an tissue cultured epidermal autograft was deemed most appropriate to repair the remaining defect.  The graft was trimmed to fit the size of the remaining defect.  The graft was then placed in the primary defect, oriented appropriately, and sutured into place. Scalpel Size: 15 blade S Plasty Text: Given the location and shape of the defect, and the orientation of relaxed skin tension lines, an S-plasty was deemed most appropriate for repair.  Using a sterile surgical marker, the appropriate outline of the S-plasty was drawn, incorporating the defect and placing the expected incisions within the relaxed skin tension lines where possible.  The area thus outlined was incised deep to adipose tissue with a #15 scalpel blade.  The skin margins were undermined to an appropriate distance in all directions utilizing iris scissors. The skin flaps were advanced over the defect.  The opposing margins were then approximated with interrupted buried subcutaneous sutures. Intermediate Repair Preamble Text (Leave Blank If You Do Not Want): Undermining was performed with blunt dissection. Suture Removal: 14 days Statement Selected Tissue Cultured Epidermal Autograft Text: The defect edges were debeveled with a #15 scalpel blade.  Given the location of the defect, shape of the defect and the proximity to free margins a tissue cultured epidermal autograft was deemed most appropriate.  The graft was then trimmed to fit the size of the defect.  The graft was then placed in the primary defect and oriented appropriately. Mastoid Interpolation Flap Text: A decision was made to reconstruct the defect utilizing an interpolation axial flap and a staged reconstruction.  A telfa template was made of the defect.  This telfa template was then used to outline the mastoid interpolation flap.  The donor area for the pedicle flap was then injected with anesthesia.  The flap was excised through the skin and subcutaneous tissue down to the layer of the underlying musculature.  The pedicle flap was carefully excised within this deep plane to maintain its blood supply.  The edges of the donor site were undermined.   The donor site was closed in a primary fashion.  The pedicle was then rotated into position and sutured.  Once the tube was sutured into place, adequate blood supply was confirmed with blanching and refill.  The pedicle was then wrapped with xeroform gauze and dressed appropriately with a telfa and gauze bandage to ensure continued blood supply and protect the attached pedicle. Composite Graft Text: The defect edges were debeveled with a #15 scalpel blade.  Given the location of the defect, shape of the defect, the proximity to free margins and the fact the defect was full thickness a composite graft was deemed most appropriate.  The defect was outline and then transferred to the donor site.  A full thickness graft was then excised from the donor site. The graft was then placed in the primary defect, oriented appropriately and then sutured into place.  The secondary defect was then repaired using a primary closure. Complex Repair And W Plasty Text: The defect edges were debeveled with a #15 scalpel blade.  The primary defect was closed partially with a complex linear closure.  Given the location of the remaining defect, shape of the defect and the proximity to free margins a W plasty was deemed most appropriate for complete closure of the defect.  Using a sterile surgical marker, an appropriate advancement flap was drawn incorporating the defect and placing the expected incisions within the relaxed skin tension lines where possible.    The area thus outlined was incised deep to adipose tissue with a #15 scalpel blade.  The skin margins were undermined to an appropriate distance in all directions utilizing iris scissors. Deep Sutures: 4-0 Vicryl V-Y Plasty Text: The defect edges were debeveled with a #15 scalpel blade.  Given the location of the defect, shape of the defect and the proximity to free margins an V-Y advancement flap was deemed most appropriate.  Using a sterile surgical marker, an appropriate advancement flap was drawn incorporating the defect and placing the expected incisions within the relaxed skin tension lines where possible.    The area thus outlined was incised deep to adipose tissue with a #15 scalpel blade.  The skin margins were undermined to an appropriate distance in all directions utilizing iris scissors. Complex Repair And M Plasty Text: The defect edges were debeveled with a #15 scalpel blade.  The primary defect was closed partially with a complex linear closure.  Given the location of the remaining defect, shape of the defect and the proximity to free margins an M plasty was deemed most appropriate for complete closure of the defect.  Using a sterile surgical marker, an appropriate advancement flap was drawn incorporating the defect and placing the expected incisions within the relaxed skin tension lines where possible.    The area thus outlined was incised deep to adipose tissue with a #15 scalpel blade.  The skin margins were undermined to an appropriate distance in all directions utilizing iris scissors. Lab Facility: [id number] O-T Plasty Text: The defect edges were debeveled with a #15 scalpel blade.  Given the location of the defect, shape of the defect and the proximity to free margins an O-T plasty was deemed most appropriate.  Using a sterile surgical marker, an appropriate O-T plasty was drawn incorporating the defect and placing the expected incisions within the relaxed skin tension lines where possible.    The area thus outlined was incised deep to adipose tissue with a #15 scalpel blade.  The skin margins were undermined to an appropriate distance in all directions utilizing iris scissors. Complex Repair And A-T Advancement Flap Text: The defect edges were debeveled with a #15 scalpel blade.  The primary defect was closed partially with a complex linear closure.  Given the location of the remaining defect, shape of the defect and the proximity to free margins an A-T advancement flap was deemed most appropriate for complete closure of the defect.  Using a sterile surgical marker, an appropriate advancement flap was drawn incorporating the defect and placing the expected incisions within the relaxed skin tension lines where possible.    The area thus outlined was incised deep to adipose tissue with a #15 scalpel blade.  The skin margins were undermined to an appropriate distance in all directions utilizing iris scissors. Mucosal Advancement Flap Text: Given the location of the defect, shape of the defect and the proximity to free margins a mucosal advancement flap was deemed most appropriate. Incisions were made with a 15 blade scalpel in the appropriate fashion along the cutaneous vermillion border and the mucosal lip. The remaining actinically damaged mucosal tissue was excised.  The mucosal advancement flap was then elevated to the gingival sulcus with care taken to preserve the neurovascular structures and advanced into the primary defect. Care was taken to ensure that precise realignment of the vermillion border was achieved. Interpolation Flap Text: A decision was made to reconstruct the defect utilizing an interpolation axial flap and a staged reconstruction.  A telfa template was made of the defect.  This telfa template was then used to outline the interpolation flap.  The donor area for the pedicle flap was then injected with anesthesia.  The flap was excised through the skin and subcutaneous tissue down to the layer of the underlying musculature.  The interpolation flap was carefully excised within this deep plane to maintain its blood supply.  The edges of the donor site were undermined.   The donor site was closed in a primary fashion.  The pedicle was then rotated into position and sutured.  Once the tube was sutured into place, adequate blood supply was confirmed with blanching and refill.  The pedicle was then wrapped with xeroform gauze and dressed appropriately with a telfa and gauze bandage to ensure continued blood supply and protect the attached pedicle. Complex Repair And Modified Advancement Flap Text: The defect edges were debeveled with a #15 scalpel blade.  The primary defect was closed partially with a complex linear closure.  Given the location of the remaining defect, shape of the defect and the proximity to free margins a modified advancement flap was deemed most appropriate for complete closure of the defect.  Using a sterile surgical marker, an appropriate advancement flap was drawn incorporating the defect and placing the expected incisions within the relaxed skin tension lines where possible.    The area thus outlined was incised deep to adipose tissue with a #15 scalpel blade.  The skin margins were undermined to an appropriate distance in all directions utilizing iris scissors. A-T Advancement Flap Text: The defect edges were debeveled with a #15 scalpel blade.  Given the location of the defect, shape of the defect and the proximity to free margins an A-T advancement flap was deemed most appropriate.  Using a sterile surgical marker, an appropriate advancement flap was drawn incorporating the defect and placing the expected incisions within the relaxed skin tension lines where possible.    The area thus outlined was incised deep to adipose tissue with a #15 scalpel blade.  The skin margins were undermined to an appropriate distance in all directions utilizing iris scissors. Size Of Margin In Cm: 0.5 Anesthesia Volume In Cc: 6 Melolabial Interpolation Flap Text: A decision was made to reconstruct the defect utilizing an interpolation axial flap and a staged reconstruction.  A telfa template was made of the defect.  This telfa template was then used to outline the melolabial interpolation flap.  The donor area for the pedicle flap was then injected with anesthesia.  The flap was excised through the skin and subcutaneous tissue down to the layer of the underlying musculature.  The pedicle flap was carefully excised within this deep plane to maintain its blood supply.  The edges of the donor site were undermined.   The donor site was closed in a primary fashion.  The pedicle was then rotated into position and sutured.  Once the tube was sutured into place, adequate blood supply was confirmed with blanching and refill.  The pedicle was then wrapped with xeroform gauze and dressed appropriately with a telfa and gauze bandage to ensure continued blood supply and protect the attached pedicle. Intermediate / Complex Repair - Final Wound Length In Cm: 4.4 Repair Performed By Another Provider Text (Leave Blank If You Do Not Want): After the tissue was excised the defect was repaired by another provider. Epidermal Autograft Text: The defect edges were debeveled with a #15 scalpel blade.  Given the location of the defect, shape of the defect and the proximity to free margins an epidermal autograft was deemed most appropriate.  Using a sterile surgical marker, the primary defect shape was transferred to the donor site. The epidermal graft was then harvested.  The skin graft was then placed in the primary defect and oriented appropriately. No Repair - Repaired With Adjacent Surgical Defect Text (Leave Blank If You Do Not Want): After the excision the defect was repaired concurrently with another surgical defect which was in close approximation. Epidermal Sutures: 4-0 Ethilon Complex Repair And Double Advancement Flap Text: The defect edges were debeveled with a #15 scalpel blade.  The primary defect was closed partially with a complex linear closure.  Given the location of the remaining defect, shape of the defect and the proximity to free margins a double advancement flap was deemed most appropriate for complete closure of the defect.  Using a sterile surgical marker, an appropriate advancement flap was drawn incorporating the defect and placing the expected incisions within the relaxed skin tension lines where possible.    The area thus outlined was incised deep to adipose tissue with a #15 scalpel blade.  The skin margins were undermined to an appropriate distance in all directions utilizing iris scissors. Complex Repair And Xenograft Text: The defect edges were debeveled with a #15 scalpel blade.  The primary defect was closed partially with a complex linear closure.  Given the location of the defect, shape of the defect and the proximity to free margins an tissue cultured epidermal autograft was deemed most appropriate to repair the remaining defect.  The graft was trimmed to fit the size of the remaining defect.  The graft was then placed in the primary defect, oriented appropriately, and sutured into place. Complex Repair And Dermal Autograft Text: The defect edges were debeveled with a #15 scalpel blade.  The primary defect was closed partially with a complex linear closure.  Given the location of the defect, shape of the defect and the proximity to free margins an dermal autograft was deemed most appropriate to repair the remaining defect.  The graft was trimmed to fit the size of the remaining defect.  The graft was then placed in the primary defect, oriented appropriately, and sutured into place. Complex Repair And V-Y Plasty Text: The defect edges were debeveled with a #15 scalpel blade.  The primary defect was closed partially with a complex linear closure.  Given the location of the remaining defect, shape of the defect and the proximity to free margins a V-Y plasty was deemed most appropriate for complete closure of the defect.  Using a sterile surgical marker, an appropriate advancement flap was drawn incorporating the defect and placing the expected incisions within the relaxed skin tension lines where possible.    The area thus outlined was incised deep to adipose tissue with a #15 scalpel blade.  The skin margins were undermined to an appropriate distance in all directions utilizing iris scissors. Complex Repair And Melolabial Flap Text: The defect edges were debeveled with a #15 scalpel blade.  The primary defect was closed partially with a complex linear closure.  Given the location of the remaining defect, shape of the defect and the proximity to free margins a melolabial flap was deemed most appropriate for complete closure of the defect.  Using a sterile surgical marker, an appropriate advancement flap was drawn incorporating the defect and placing the expected incisions within the relaxed skin tension lines where possible.    The area thus outlined was incised deep to adipose tissue with a #15 scalpel blade.  The skin margins were undermined to an appropriate distance in all directions utilizing iris scissors. O-T Advancement Flap Text: The defect edges were debeveled with a #15 scalpel blade.  Given the location of the defect, shape of the defect and the proximity to free margins an O-T advancement flap was deemed most appropriate.  Using a sterile surgical marker, an appropriate advancement flap was drawn incorporating the defect and placing the expected incisions within the relaxed skin tension lines where possible.    The area thus outlined was incised deep to adipose tissue with a #15 scalpel blade.  The skin margins were undermined to an appropriate distance in all directions utilizing iris scissors. Melolabial Transposition Flap Text: The defect edges were debeveled with a #15 scalpel blade.  Given the location of the defect and the proximity to free margins a melolabial flap was deemed most appropriate.  Using a sterile surgical marker, an appropriate melolabial transposition flap was drawn incorporating the defect.    The area thus outlined was incised deep to adipose tissue with a #15 scalpel blade.  The skin margins were undermined to an appropriate distance in all directions utilizing iris scissors. Epidermal Closure Graft Donor Site (Optional): simple interrupted Lip Wedge Excision Repair Text: Given the location of the defect and the proximity to free margins a full thickness wedge repair was deemed most appropriate.  Using a sterile surgical marker, the appropriate repair was drawn incorporating the defect and placing the expected incisions perpendicular to the vermillion border.  The vermillion border was also meticulously outlined to ensure appropriate reapproximation during the repair.  The area thus outlined was incised through and through with a #15 scalpel blade.  The muscularis and dermis were reaproximated with deep sutures following hemostasis. Care was taken to realign the vermillion border before proceeding with the superficial closure.  Once the vermillion was realigned the superfical and mucosal closure was finished. O-L Flap Text: The defect edges were debeveled with a #15 scalpel blade.  Given the location of the defect, shape of the defect and the proximity to free margins an O-L flap was deemed most appropriate.  Using a sterile surgical marker, an appropriate advancement flap was drawn incorporating the defect and placing the expected incisions within the relaxed skin tension lines where possible.    The area thus outlined was incised deep to adipose tissue with a #15 scalpel blade.  The skin margins were undermined to an appropriate distance in all directions utilizing iris scissors. Spiral Flap Text: The defect edges were debeveled with a #15 scalpel blade.  Given the location of the defect, shape of the defect and the proximity to free margins a spiral flap was deemed most appropriate.  Using a sterile surgical marker, an appropriate rotation flap was drawn incorporating the defect and placing the expected incisions within the relaxed skin tension lines where possible. The area thus outlined was incised deep to adipose tissue with a #15 scalpel blade.  The skin margins were undermined to an appropriate distance in all directions utilizing iris scissors. Wound Care: Vaseline Bilobed Transposition Flap Text: The defect edges were debeveled with a #15 scalpel blade.  Given the location of the defect and the proximity to free margins a bilobed transposition flap was deemed most appropriate.  Using a sterile surgical marker, an appropriate bilobe flap drawn around the defect.    The area thus outlined was incised deep to adipose tissue with a #15 scalpel blade.  The skin margins were undermined to an appropriate distance in all directions utilizing iris scissors. Saucerization Excision Additional Text (Leave Blank If You Do Not Want): The margin was drawn around the clinically apparent lesion.  Incisions were then made along these lines, in a tangential fashion, to the appropriate tissue plane and the lesion was extirpated. Advancement Flap (Double) Text: The defect edges were debeveled with a #15 scalpel blade.  Given the location of the defect and the proximity to free margins a double advancement flap was deemed most appropriate.  Using a sterile surgical marker, the appropriate advancement flaps were drawn incorporating the defect and placing the expected incisions within the relaxed skin tension lines where possible.    The area thus outlined was incised deep to adipose tissue with a #15 scalpel blade.  The skin margins were undermined to an appropriate distance in all directions utilizing iris scissors. Perilesional Excision Additional Text (Leave Blank If You Do Not Want): The margin was drawn around the clinically apparent lesion. Incisions were then made along these lines to the appropriate tissue plane and the lesion was extirpated. Helical Rim Advancement Flap Text: The defect edges were debeveled with a #15 blade scalpel.  Given the location of the defect and the proximity to free margins (helical rim) a double helical rim advancement flap was deemed most appropriate.  Using a sterile surgical marker, the appropriate advancement flaps were drawn incorporating the defect and placing the expected incisions between the helical rim and antihelix where possible.  The area thus outlined was incised through and through with a #15 scalpel blade.  With a skin hook and iris scissors, the flaps were gently and sharply undermined and freed up. Complex Repair And Z Plasty Text: The defect edges were debeveled with a #15 scalpel blade.  The primary defect was closed partially with a complex linear closure.  Given the location of the remaining defect, shape of the defect and the proximity to free margins a Z plasty was deemed most appropriate for complete closure of the defect.  Using a sterile surgical marker, an appropriate advancement flap was drawn incorporating the defect and placing the expected incisions within the relaxed skin tension lines where possible.    The area thus outlined was incised deep to adipose tissue with a #15 scalpel blade.  The skin margins were undermined to an appropriate distance in all directions utilizing iris scissors. Trilobed Flap Text: The defect edges were debeveled with a #15 scalpel blade.  Given the location of the defect and the proximity to free margins a trilobed flap was deemed most appropriate.  Using a sterile surgical marker, an appropriate trilobed flap drawn around the defect.    The area thus outlined was incised deep to adipose tissue with a #15 scalpel blade.  The skin margins were undermined to an appropriate distance in all directions utilizing iris scissors. W Plasty Text: The lesion was extirpated to the level of the fat with a #15 scalpel blade.  Given the location of the defect, shape of the defect and the proximity to free margins a W-plasty was deemed most appropriate for repair.  Using a sterile surgical marker, the appropriate transposition arms of the W-plasty were drawn incorporating the defect and placing the expected incisions within the relaxed skin tension lines where possible.    The area thus outlined was incised deep to adipose tissue with a #15 scalpel blade.  The skin margins were undermined to an appropriate distance in all directions utilizing iris scissors.  The opposing transposition arms were then transposed into place in opposite direction and anchored with interrupted buried subcutaneous sutures. Star Wedge Flap Text: The defect edges were debeveled with a #15 scalpel blade.  Given the location of the defect, shape of the defect and the proximity to free margins a star wedge flap was deemed most appropriate.  Using a sterile surgical marker, an appropriate rotation flap was drawn incorporating the defect and placing the expected incisions within the relaxed skin tension lines where possible. The area thus outlined was incised deep to adipose tissue with a #15 scalpel blade.  The skin margins were undermined to an appropriate distance in all directions utilizing iris scissors. Cartilage Graft Text: The defect edges were debeveled with a #15 scalpel blade.  Given the location of the defect, shape of the defect, the fact the defect involved a full thickness cartilage defect a cartilage graft was deemed most appropriate.  An appropriate donor site was identified, cleansed, and anesthetized. The cartilage graft was then harvested and transferred to the recipient site, oriented appropriately and then sutured into place.  The secondary defect was then repaired using a primary closure. Muscle Hinge Flap Text: The defect edges were debeveled with a #15 scalpel blade.  Given the size, depth and location of the defect and the proximity to free margins a muscle hinge flap was deemed most appropriate.  Using a sterile surgical marker, an appropriate hinge flap was drawn incorporating the defect. The area thus outlined was incised with a #15 scalpel blade.  The skin margins were undermined to an appropriate distance in all directions utilizing iris scissors. Anesthesia Type: 1% lidocaine with epinephrine Keystone Flap Text: The defect edges were debeveled with a #15 scalpel blade.  Given the location of the defect, shape of the defect a keystone flap was deemed most appropriate.  Using a sterile surgical marker, an appropriate keystone flap was drawn incorporating the defect, outlining the appropriate donor tissue and placing the expected incisions within the relaxed skin tension lines where possible. The area thus outlined was incised deep to adipose tissue with a #15 scalpel blade.  The skin margins were undermined to an appropriate distance in all directions around the primary defect and laterally outward around the flap utilizing iris scissors. Hemostasis: Electrocautery Complex Repair And Epidermal Autograft Text: The defect edges were debeveled with a #15 scalpel blade.  The primary defect was closed partially with a complex linear closure.  Given the location of the defect, shape of the defect and the proximity to free margins an epidermal autograft was deemed most appropriate to repair the remaining defect.  The graft was trimmed to fit the size of the remaining defect.  The graft was then placed in the primary defect, oriented appropriately, and sutured into place. Anesthesia Type: 1% lidocaine with 1:100,000 epinephrine Purse String (Intermediate) Text: Given the location of the defect and the characteristics of the surrounding skin a pursestring intermediate closure was deemed most appropriate.  Undermining was performed circumfirentially around the surgical defect.  A purstring suture was then placed and tightened. Advancement Flap (Single) Text: The defect edges were debeveled with a #15 scalpel blade.  Given the location of the defect and the proximity to free margins a single advancement flap was deemed most appropriate.  Using a sterile surgical marker, an appropriate advancement flap was drawn incorporating the defect and placing the expected incisions within the relaxed skin tension lines where possible.    The area thus outlined was incised deep to adipose tissue with a #15 scalpel blade.  The skin margins were undermined to an appropriate distance in all directions utilizing iris scissors. Lab: [id number] Island Pedicle Flap Text: The defect edges were debeveled with a #15 scalpel blade.  Given the location of the defect, shape of the defect and the proximity to free margins an island pedicle advancement flap was deemed most appropriate.  Using a sterile surgical marker, an appropriate advancement flap was drawn incorporating the defect, outlining the appropriate donor tissue and placing the expected incisions within the relaxed skin tension lines where possible.    The area thus outlined was incised deep to adipose tissue with a #15 scalpel blade.  The skin margins were undermined to an appropriate distance in all directions around the primary defect and laterally outward around the island pedicle utilizing iris scissors.  There was minimal undermining beneath the pedicle flap. Repair Type: Complex Z Plasty Text: The lesion was extirpated to the level of the fat with a #15 scalpel blade.  Given the location of the defect, shape of the defect and the proximity to free margins a Z-plasty was deemed most appropriate for repair.  Using a sterile surgical marker, the appropriate transposition arms of the Z-plasty were drawn incorporating the defect and placing the expected incisions within the relaxed skin tension lines where possible.    The area thus outlined was incised deep to adipose tissue with a #15 scalpel blade.  The skin margins were undermined to an appropriate distance in all directions utilizing iris scissors.  The opposing transposition arms were then transposed into place in opposite direction and anchored with interrupted buried subcutaneous sutures. Complex Repair And Skin Substitute Graft Text: The defect edges were debeveled with a #15 scalpel blade.  The primary defect was closed partially with a complex linear closure.  Given the location of the remaining defect, shape of the defect and the proximity to free margins a skin substitute graft was deemed most appropriate to repair the remaining defect.  The graft was trimmed to fit the size of the remaining defect.  The graft was then placed in the primary defect, oriented appropriately, and sutured into place. Rotation Flap Text: The defect edges were debeveled with a #15 scalpel blade.  Given the location of the defect, shape of the defect and the proximity to free margins a rotation flap was deemed most appropriate.  Using a sterile surgical marker, an appropriate rotation flap was drawn incorporating the defect and placing the expected incisions within the relaxed skin tension lines where possible.    The area thus outlined was incised deep to adipose tissue with a #15 scalpel blade.  The skin margins were undermined to an appropriate distance in all directions utilizing iris scissors. Dermal Autograft Text: The defect edges were debeveled with a #15 scalpel blade.  Given the location of the defect, shape of the defect and the proximity to free margins a dermal autograft was deemed most appropriate.  Using a sterile surgical marker, the primary defect shape was transferred to the donor site. The area thus outlined was incised deep to adipose tissue with a #15 scalpel blade.  The harvested graft was then trimmed of adipose and epidermal tissue until only dermis was left.  The skin graft was then placed in the primary defect and oriented appropriately. Complex Repair And Dorsal Nasal Flap Text: The defect edges were debeveled with a #15 scalpel blade.  The primary defect was closed partially with a complex linear closure.  Given the location of the remaining defect, shape of the defect and the proximity to free margins a dorsal nasal flap was deemed most appropriate for complete closure of the defect.  Using a sterile surgical marker, an appropriate flap was drawn incorporating the defect and placing the expected incisions within the relaxed skin tension lines where possible.    The area thus outlined was incised deep to adipose tissue with a #15 scalpel blade.  The skin margins were undermined to an appropriate distance in all directions utilizing iris scissors. Medical Necessity Clause: This procedure was medically necessary because the lesion that was treated was : enlarging Complex Repair And Ftsg Text: The defect edges were debeveled with a #15 scalpel blade.  The primary defect was closed partially with a complex linear closure.  Given the location of the defect, shape of the defect and the proximity to free margins a full thickness skin graft was deemed most appropriate to repair the remaining defect.  The graft was trimmed to fit the size of the remaining defect.  The graft was then placed in the primary defect, oriented appropriately, and sutured into place. Complex Repair And O-L Flap Text: The defect edges were debeveled with a #15 scalpel blade.  The primary defect was closed partially with a complex linear closure.  Given the location of the remaining defect, shape of the defect and the proximity to free margins an O-L flap was deemed most appropriate for complete closure of the defect.  Using a sterile surgical marker, an appropriate flap was drawn incorporating the defect and placing the expected incisions within the relaxed skin tension lines where possible.    The area thus outlined was incised deep to adipose tissue with a #15 scalpel blade.  The skin margins were undermined to an appropriate distance in all directions utilizing iris scissors. Consent was obtained from the patient. The risks and benefits to therapy were discussed in detail. Specifically, the risks of infection, scarring, bleeding, prolonged wound healing, incomplete removal, allergy to anesthesia, nerve injury and recurrence were addressed. Prior to the procedure, the treatment site was clearly identified and confirmed by the patient. All components of Universal Protocol/PAUSE Rule completed. Complex Repair And Rotation Flap Text: The defect edges were debeveled with a #15 scalpel blade.  The primary defect was closed partially with a complex linear closure.  Given the location of the remaining defect, shape of the defect and the proximity to free margins a rotation flap was deemed most appropriate for complete closure of the defect.  Using a sterile surgical marker, an appropriate advancement flap was drawn incorporating the defect and placing the expected incisions within the relaxed skin tension lines where possible.    The area thus outlined was incised deep to adipose tissue with a #15 scalpel blade.  The skin margins were undermined to an appropriate distance in all directions utilizing iris scissors. Excisional Biopsy Additional Text (Leave Blank If You Do Not Want): The margin was drawn around the clinically apparent lesion. An elliptical shape was then drawn on the skin incorporating the lesion and margins.  Incisions were then made along these lines to the appropriate tissue plane and the lesion was extirpated. Detail Level: Detailed Burow's Advancement Flap Text: The defect edges were debeveled with a #15 scalpel blade.  Given the location of the defect and the proximity to free margins a Burow's advancement flap was deemed most appropriate.  Using a sterile surgical marker, the appropriate advancement flap was drawn incorporating the defect and placing the expected incisions within the relaxed skin tension lines where possible.    The area thus outlined was incised deep to adipose tissue with a #15 scalpel blade.  The skin margins were undermined to an appropriate distance in all directions utilizing iris scissors. Transposition Flap Text: The defect edges were debeveled with a #15 scalpel blade.  Given the location of the defect and the proximity to free margins a transposition flap was deemed most appropriate.  Using a sterile surgical marker, an appropriate transposition flap was drawn incorporating the defect.    The area thus outlined was incised deep to adipose tissue with a #15 scalpel blade.  The skin margins were undermined to an appropriate distance in all directions utilizing iris scissors. Double Island Pedicle Flap Text: The defect edges were debeveled with a #15 scalpel blade.  Given the location of the defect, shape of the defect and the proximity to free margins a double island pedicle advancement flap was deemed most appropriate.  Using a sterile surgical marker, an appropriate advancement flap was drawn incorporating the defect, outlining the appropriate donor tissue and placing the expected incisions within the relaxed skin tension lines where possible.    The area thus outlined was incised deep to adipose tissue with a #15 scalpel blade.  The skin margins were undermined to an appropriate distance in all directions around the primary defect and laterally outward around the island pedicle utilizing iris scissors.  There was minimal undermining beneath the pedicle flap. Island Pedicle Flap-Requiring Vessel Identification Text: The defect edges were debeveled with a #15 scalpel blade.  Given the location of the defect, shape of the defect and the proximity to free margins an island pedicle advancement flap was deemed most appropriate.  Using a sterile surgical marker, an appropriate advancement flap was drawn, based on the axial vessel mentioned above, incorporating the defect, outlining the appropriate donor tissue and placing the expected incisions within the relaxed skin tension lines where possible.    The area thus outlined was incised deep to adipose tissue with a #15 scalpel blade.  The skin margins were undermined to an appropriate distance in all directions around the primary defect and laterally outward around the island pedicle utilizing iris scissors.  There was minimal undermining beneath the pedicle flap. Complex Repair And Bilobe Flap Text: The defect edges were debeveled with a #15 scalpel blade.  The primary defect was closed partially with a complex linear closure.  Given the location of the remaining defect, shape of the defect and the proximity to free margins a bilobe flap was deemed most appropriate for complete closure of the defect.  Using a sterile surgical marker, an appropriate advancement flap was drawn incorporating the defect and placing the expected incisions within the relaxed skin tension lines where possible.    The area thus outlined was incised deep to adipose tissue with a #15 scalpel blade.  The skin margins were undermined to an appropriate distance in all directions utilizing iris scissors. Billing Type: Third-Party Bill Cheek Interpolation Flap Text: A decision was made to reconstruct the defect utilizing an interpolation axial flap and a staged reconstruction.  A telfa template was made of the defect.  This telfa template was then used to outline the Cheek Interpolation flap.  The donor area for the pedicle flap was then injected with anesthesia.  The flap was excised through the skin and subcutaneous tissue down to the layer of the underlying musculature.  The interpolation flap was carefully excised within this deep plane to maintain its blood supply.  The edges of the donor site were undermined.   The donor site was closed in a primary fashion.  The pedicle was then rotated into position and sutured.  Once the tube was sutured into place, adequate blood supply was confirmed with blanching and refill.  The pedicle was then wrapped with xeroform gauze and dressed appropriately with a telfa and gauze bandage to ensure continued blood supply and protect the attached pedicle. Split-Thickness Skin Graft Text: The defect edges were debeveled with a #15 scalpel blade.  Given the location of the defect, shape of the defect and the proximity to free margins a split thickness skin graft was deemed most appropriate.  Using a sterile surgical marker, the primary defect shape was transferred to the donor site. The split thickness graft was then harvested.  The skin graft was then placed in the primary defect and oriented appropriately. V-Y Flap Text: The defect edges were debeveled with a #15 scalpel blade.  Given the location of the defect, shape of the defect and the proximity to free margins a V-Y flap was deemed most appropriate.  Using a sterile surgical marker, an appropriate advancement flap was drawn incorporating the defect and placing the expected incisions within the relaxed skin tension lines where possible.    The area thus outlined was incised deep to adipose tissue with a #15 scalpel blade.  The skin margins were undermined to an appropriate distance in all directions utilizing iris scissors. Crescentic Advancement Flap Text: The defect edges were debeveled with a #15 scalpel blade.  Given the location of the defect and the proximity to free margins a crescentic advancement flap was deemed most appropriate.  Using a sterile surgical marker, the appropriate advancement flap was drawn incorporating the defect and placing the expected incisions within the relaxed skin tension lines where possible.    The area thus outlined was incised deep to adipose tissue with a #15 scalpel blade.  The skin margins were undermined to an appropriate distance in all directions utilizing iris scissors. Xenograft Text: The defect edges were debeveled with a #15 scalpel blade.  Given the location of the defect, shape of the defect and the proximity to free margins a xenograft was deemed most appropriate.  The graft was then trimmed to fit the size of the defect.  The graft was then placed in the primary defect and oriented appropriately. Dorsal Nasal Flap Text: The defect edges were debeveled with a #15 scalpel blade.  Given the location of the defect and the proximity to free margins a dorsal nasal flap was deemed most appropriate.  Using a sterile surgical marker, an appropriate dorsal nasal flap was drawn around the defect.    The area thus outlined was incised deep to adipose tissue with a #15 scalpel blade.  The skin margins were undermined to an appropriate distance in all directions utilizing iris scissors. Bilateral Helical Rim Advancement Flap Text: The defect edges were debeveled with a #15 blade scalpel.  Given the location of the defect and the proximity to free margins (helical rim) a bilateral helical rim advancement flap was deemed most appropriate.  Using a sterile surgical marker, the appropriate advancement flaps were drawn incorporating the defect and placing the expected incisions between the helical rim and antihelix where possible.  The area thus outlined was incised through and through with a #15 scalpel blade.  With a skin hook and iris scissors, the flaps were gently and sharply undermined and freed up. Complex Repair And Split-Thickness Skin Graft Text: The defect edges were debeveled with a #15 scalpel blade.  The primary defect was closed partially with a complex linear closure.  Given the location of the defect, shape of the defect and the proximity to free margins a split thickness skin graft was deemed most appropriate to repair the remaining defect.  The graft was trimmed to fit the size of the remaining defect.  The graft was then placed in the primary defect, oriented appropriately, and sutured into place. Complex Repair And Rhombic Flap Text: The defect edges were debeveled with a #15 scalpel blade.  The primary defect was closed partially with a complex linear closure.  Given the location of the remaining defect, shape of the defect and the proximity to free margins a rhombic flap was deemed most appropriate for complete closure of the defect.  Using a sterile surgical marker, an appropriate advancement flap was drawn incorporating the defect and placing the expected incisions within the relaxed skin tension lines where possible.    The area thus outlined was incised deep to adipose tissue with a #15 scalpel blade.  The skin margins were undermined to an appropriate distance in all directions utilizing iris scissors. Purse String (Simple) Text: Given the location of the defect and the characteristics of the surrounding skin a purse string simple closure was deemed most appropriate.  Undermining was performed circumferentially around the surgical defect.  A purse string suture was then placed and tightened. O-Z Plasty Text: The defect edges were debeveled with a #15 scalpel blade.  Given the location of the defect, shape of the defect and the proximity to free margins an O-Z plasty (double transposition flap) was deemed most appropriate.  Using a sterile surgical marker, the appropriate transposition flaps were drawn incorporating the defect and placing the expected incisions within the relaxed skin tension lines where possible.    The area thus outlined was incised deep to adipose tissue with a #15 scalpel blade.  The skin margins were undermined to an appropriate distance in all directions utilizing iris scissors.  Hemostasis was achieved with electrocautery.  The flaps were then transposed into place, one clockwise and the other counterclockwise, and anchored with interrupted buried subcutaneous sutures.

## 2020-02-20 VITALS
SYSTOLIC BLOOD PRESSURE: 112 MMHG | HEART RATE: 51 BPM | RESPIRATION RATE: 18 BRPM | TEMPERATURE: 98 F | DIASTOLIC BLOOD PRESSURE: 72 MMHG

## 2020-02-20 PROCEDURE — 85018 HEMOGLOBIN: CPT

## 2020-02-20 PROCEDURE — 86901 BLOOD TYPING SEROLOGIC RH(D): CPT

## 2020-02-20 PROCEDURE — 86780 TREPONEMA PALLIDUM: CPT

## 2020-02-20 PROCEDURE — 86880 COOMBS TEST DIRECT: CPT

## 2020-02-20 PROCEDURE — 86870 RBC ANTIBODY IDENTIFICATION: CPT

## 2020-02-20 PROCEDURE — 86703 HIV-1/HIV-2 1 RESULT ANTBDY: CPT

## 2020-02-20 PROCEDURE — 36415 COLL VENOUS BLD VENIPUNCTURE: CPT

## 2020-02-20 PROCEDURE — 85027 COMPLETE CBC AUTOMATED: CPT

## 2020-02-20 PROCEDURE — 87389 HIV-1 AG W/HIV-1&-2 AB AG IA: CPT

## 2020-02-20 PROCEDURE — 86900 BLOOD TYPING SEROLOGIC ABO: CPT

## 2020-02-20 PROCEDURE — 85014 HEMATOCRIT: CPT

## 2020-02-20 PROCEDURE — 59050 FETAL MONITOR W/REPORT: CPT

## 2020-02-20 PROCEDURE — 86850 RBC ANTIBODY SCREEN: CPT

## 2020-02-20 PROCEDURE — 59025 FETAL NON-STRESS TEST: CPT

## 2020-02-20 PROCEDURE — G0463: CPT

## 2020-02-20 PROCEDURE — 90715 TDAP VACCINE 7 YRS/> IM: CPT

## 2020-02-20 PROCEDURE — 85461 HEMOGLOBIN FETAL: CPT

## 2020-02-20 RX ADMIN — Medication 975 MILLIGRAM(S): at 08:59

## 2020-02-20 RX ADMIN — Medication 975 MILLIGRAM(S): at 03:32

## 2020-02-20 RX ADMIN — Medication 975 MILLIGRAM(S): at 04:31

## 2020-02-20 RX ADMIN — TETANUS TOXOID, REDUCED DIPHTHERIA TOXOID AND ACELLULAR PERTUSSIS VACCINE, ADSORBED 0.5 MILLILITER(S): 5; 2.5; 8; 8; 2.5 SUSPENSION INTRAMUSCULAR at 06:13

## 2020-02-20 RX ADMIN — Medication 600 MILLIGRAM(S): at 06:13

## 2020-02-20 RX ADMIN — Medication 975 MILLIGRAM(S): at 10:00

## 2020-02-20 NOTE — PROGRESS NOTE ADULT - PROBLEM SELECTOR PLAN 1
Patient feels well  Hgb:  14.4  --> 12.7  Continue the current pain medication  Encourage  Ambulation  Encourage regular diet  DVT ppx: SCDs only when not ambulating  She is stable, tolerates a diet   Plan for discharge today pending attending assessment

## 2020-02-20 NOTE — PROGRESS NOTE ADULT - SUBJECTIVE AND OBJECTIVE BOX
MRN: [id number]  Date Admitted: 20  Location: [facility] 2EST 2005 ([facility] 2EST)  Attending: Ronna Nugent      Post Partum Progress Note: Vaginal delivery     PARK WEN is a 30y  s/p vaginal delivery @ 38wks 2/ to PROM, PPD #2, MALE INFANT    SUBJECTIVE:  Patient was seen and examined at bedside.   Reports feeling well this AM.   Pain is well controlled with PRN pain medication.   Tolerating a regular diet. Denies nausea/vomiting.  + flatus. + BM.   Voiding spontaneously. Ambulating without assistance.   Reports normal lochia which is decreasing.    She is breastfeeding and the baby is latching on well today  Denies fevers, chills, SOB, CP, HA, vision changes or calf pain.      OBJECTIVE:  Physical exam:  General: AOx3, NAD.  Heart: RRR. S1S2.  Lungs: CTABL. Good airflow bilaterally.   Abdomen: +BS, Soft, appropriately tender, nondistended, no guarding or rebound tenderness, firm uterine fundus at umbilicus  VE: +Lochia  Ext: No DVT signs, warm extremities.    Vital Signs Last 24 Hrs  T(C): 36.5 (2020 23:12), Max: 36.8 (2020 08:13)  T(F): 97.7 (2020 23:12), Max: 98.3 (2020 08:13)  HR: 59 (2020 23:12) (48 - 59)  BP: 109/66 (2020 23:12) (102/63 - 109/66)  RR: 18 (2020 23:12) (18 - 18)  SpO2: 97% (2020 23:12) (97% - 97%)    LABS:                        12.7   x     )-----------( x        ( 2020 07:07 )             38.8

## 2022-02-16 NOTE — OB PROVIDER H&P - NS_CONSENTSAPP_OBGYN_ALL_OB
Patient:   PIOTR CORREIA            MRN: [id number]            FIN: [id number]               Age:   75 years     Sex:  Male     :  1945   Associated Diagnoses:   Type 2 diabetes mellitus without complications; Anemia; Atherosclerotic heart disease of native coronary artery without angina pectoris   Author:   Dexter Silva MD      Visit Information      Date of Service: 2021 10:37 am  Performing Location: [facility]  Encounter#: [id number]      Primary Care Provider (PCP):  Dexter Silva MD    NPI# [id number]      Referring Provider:  Dexter Silva MD# [id number]      Chief Complaint   2021 10:41 AM CDT   Anemia follow up. Increased fatigue. DM labs due also     Chief complaint and symptoms noted above confirmed with patient.      History of Present Illness             The patient presents for follow-up evaluation of diabetes.  The quality of the patient's diabetes is described as being unchanged from previous visit.  Exacerbating factors consist of none.  Relieving factors consist of diet changes.  Glucose results: within target range.  Hemoglobin A1c results: < 6% and within target range.        Interval History   Anemia   The course is progressing as expected.  The effect on daily activities is change in activity level.  Associated symptoms characterized by fatigue and shortness of breath.        Review of Systems   Constitutional:  Negative.    Respiratory:  Negative.    Cardiovascular:  Negative.    Gastrointestinal:  Negative.    Endocrine:  Negative except as documented in history of present illness.       Health Status   Allergies:    Allergic Reactions (Selected)  No Known Medication Allergies   Medications:  (Selected)   Prescriptions  Prescribed  CPAP 13: CPAP 13, See Instructions, Instructions: Use every night. Have a download in the sleep center in one month., Supply, # 1 EA, 0 Refill(s), Type: Maintenance  FLUoxetine 40 mg oral capsule:  = 1 cap(s) ( 40 mg ), Oral, daily, # 90 cap(s), 0 Refill(s), Type: Maintenance, Pharmacy: [facility], 1 cap(s) Oral daily, 67, in, 05/28/20 8:57:00 CDT, Height Measured, 198, lb, 04/02/21 9:34:00 CDT, Weight Measured  FREESTYLE LITE      JUAN C: 1 EA, id, daily, # 50 EA, 11 Refill(s), Pharmacy: [id number]  MetFORMIN (Eqv-Glucophage XR) 500 mg oral tablet, extended release: = 2 tab(s) ( 1,000 mg ), Oral, bid, # 360 tab(s), 0 Refill(s), Type: Maintenance, Pharmacy: [facility], 2 tab(s) Oral bid, 67, in, 05/28/20 8:57:00 CDT, Height Measured, 198, lb, 04/02/21 9:34:00 CDT, Weight Measured  Norco 5 mg-325 mg oral tablet: 1 tab(s), PO, q4hr, PRN: for pain, # 24 tab(s), 0 Refill(s), Type: Maintenance, Pharmacy: [facility], 1 tab(s) Oral q4 hrs,PRN:for pain  atorvastatin 40 mg oral tablet: = 1 tab(s) ( 40 mg ), Oral, daily, # 90 tab(s), 0 Refill(s), Type: Maintenance, Pharmacy: [facility], 1 tab(s) Oral daily, 67, in, 05/28/20 8:57:00 CDT, Height Measured, 198, lb, 04/02/21 9:34:00 CDT, Weight Measured  doxazosin 2 mg oral tablet: = 1 tab(s) ( 2 mg ), Oral, daily, # 90 tab(s), 0 Refill(s), Type: Maintenance, Pharmacy: [facility], 1 tab(s) Oral daily, 67, in, 05/28/20 8:57:00 CDT, Height Measured, 198, lb, 04/02/21 9:34:00 CDT, Weight Measured  insulin isophane (NPH) 100 units/mL human recombinant subcutaneous suspension: ( 4 units ), Subcutaneous, daily, # 3 pen_needle, 1 Refill(s), Type: Maintenance, [id number], 4 units Subcutaneous daily, 67, in, 05/28/20 8:57:00 CDT, Height Measured, 180, lb, 07/08/21 14:57:00 CDT, Weight Measured  isosorbide mononitrate 30 mg oral tablet, extended release: = 2 tab(s), Oral, qam, # 180 tab(s), 3 Refill(s), Type: Maintenance, Pharmacy: [facility], Due for appt in April, 2 tab(s) Oral qam, 67, in, 05/28/20 8:57:00 CDT, Height Measured, 197.2, lb, 05/21/20 9:22:00 CDT, Weight  Measured  nitroglycerin 0.4 mg sublingual tablet: = 1 tab(s) ( 0.4 mg ), SL, q5min, Instructions: If chest pain not relieved in 5 minutes after first dose, seek immediate medical attention, PRN: for chest pain, # 100 tab(s), 3 Refill(s), Type: Maintenance, Pharmacy: [facility], This is a 3...  Documented Medications  Documented  Blood Builder: Blood Builder, Instructions: 1 tab po daily, 0 Refill(s), Type: Maintenance  Eliquis 5 mg oral tablet: ( 5 mg ), Oral, bid, # 60 tab(s), 0 Refill(s), Type: Maintenance  Fish Oil oral capsule: po, daily, 0 Refill(s), Type: Maintenance  Iron 100 Plus oral tablet: See Instructions, Instructions: 65mg bid Oral daily, 0 Refill(s), Type: Maintenance  Iron: Iron, Instructions: 325mg daily, 0 Refill(s), Type: Maintenance  Vitamin C: daily, 0 Refill(s), Type: Maintenance  amoxicillin 500 mg oral capsule: See Instructions, Instructions: Take 4 caps 1 hour prior to the procedure, 0 Refill(s), Type: Maintenance  aspirin: Instructions: 81mg tab po daily, 0 Refill(s), Type: Maintenance  pantoprazole 40 mg oral delayed release tablet: See Instructions, Instructions: 0.5 tab(s) Oral daily, 0 Refill(s), Type: Maintenance   Problem list:    All Problems (Selected)  Obstructive sleep apnea (adult) (pediatric) / SNOMED CT 140740173 / Confirmed  Back pain, chronic / SNOMED CT 235738296 / Confirmed  Type 2 diabetes mellitus without complications / SNOMED CT 873982972 / Confirmed  Obesity, unspecified / SNOMED CT 9508082883 / Probable  History of DVT in adulthood / SNOMED CT 9961755335 / Confirmed  Atherosclerotic heart disease of native coronary artery without angina pectoris / SNOMED CT 8440545508 / Confirmed  Depression, Recurrent / SNOMED CT 411721607 / Confirmed  BPH (benign prostatic hyperplasia) / SNOMED CT 604303372 / Confirmed  Hyperlipemia / SNOMED CT 597772444 / Confirmed  Anemia / SNOMED CT 913919558 / Confirmed  Essential (primary) hypertension / SNOMED CT 56290678 / Confirmed       Histories   Past Medical History:    Active  Obstructive sleep apnea (adult) (pediatric) (SNOMED CT 041378228): Onset on 2005 at 59 years.  Comments:  10/18/2013 CDT 3:10 PM CDT - Michel Fields MD  AHI 7.7 and REM AHI 22 in 2013 CST 1:54 PM CST - Michel Fields MD  On 11/10/2013 AHI 18.3 with oximetry susanne 77%. Good/optimal CPAP titration to 12 with 6.5 minutes supine REM  Hyperlipemia (SNOMED CT 132852098)  Type 2 diabetes mellitus without complications (SNOMED CT 875080099)  Atherosclerotic heart disease of native coronary artery without angina pectoris (SNOMED CT 2385941365)  BPH (benign prostatic hyperplasia) (SNOMED CT 905907840)  Depression, Recurrent (SNOMED CT 050517569)  Obesity, unspecified (SNOMED CT 9740142642)  Back pain, chronic (SNOMED CT 703319864)  Essential (primary) hypertension (SNOMED CT 26782129)  Resolved  Inpatient stay (SNOMED CT 546915379): Onset on 3/20/2021 at 75 years.  Resolved on 3/22/2021 at 75 years.  Comments:  3/25/2021 CDT 10:53 AM CDT - Danny [place], [place] - Pneumonia of both lungs due to infectious organism.  ACUTE MYOCARDIAL INFARCTION (ICD-9-):  Resolved in  at 61 years.   Family History:    Hypertension  Father ()  Heart disease  Father ()  Alcohol abuse  Mother ()  Stroke  Father ()  Liver disease  Mother ()     Procedure history:    Angiogram (SNOMED CT 778394043) in 2017 at 72 Years.  Colonoscopy (SNOMED CT 501721912) performed by Shukri Arndt on 2013 at 68 Years.  Comments:  2013 1:13 PM CST - Germaine Lea RN  Sedation: MAC  Diverticulosis in the sigmoid colon, otherwise normal.  Repeat in 10 years.  Angioplasty (SNOMED CT 1155144) in the month of 2007 at 61 Years.  CABG - Coronary artery bypass graft (SNOMED CT 042137168) in the month of 2004 at 58 Years.  Colonoscopy (SNOMED CT 589039278) performed by Aashish Connolly MD on 2002 at 56  Years.  Comments:  12/9/2010 7:05 AM CST - Danny Isabella  Repeat in 10 years.  ORIF right hand in 2001 at 56 Years.  Flexible sigmoidoscopy (SNOMED CT 87007025) performed by Castillo Anaya MD on 11/13/1995 at 50 Years.  Comments:  10/16/2013 9:17 AM CDT - Gisele Arevalo  Negative.  ORIF left shoulder in 1975 at 30 Years.   Social History:        Electronic Cigarette/Vaping Assessment            Electronic Cigarette Use: Former use, quit more than 90 days ago.      Alcohol Assessment: Past            Quit 1974      Tobacco Assessment: Past            Quit 1972            Former smoker, quit more than 30 days ago      Substance Abuse Assessment: Denies Substance Abuse            Never      Employment and Education Assessment            Retired, Work/School description: .  Highest education level: High school.      Home and Environment Assessment            Marital status: .  Spouse/Partner name: Brianda.  Living situation: Home/Independent.               Injuries/Abuse/Neglect in household: No.  Feels unsafe at home: No.  Family/Friends available for support:               Yes.      Nutrition and Health Assessment            Type of diet: Regular.  Wants to lose weight: Yes.  Sleeping concerns: No.  Feels highly stressed: No.      Exercise and Physical Activity Assessment: Occasional exercise            Exercise frequency: 1-2 times/week.  Exercise type: Walking.      Sexual Assessment            Sexually active: No.  Identifies as male, History of STD: No.  History of sexual abuse: No.      Other Assessment            Hearing impairment.        Physical Examination   Vital Signs   7/20/2021 10:41 AM CDT Temperature Tympanic 98.3 DegF    Peripheral Pulse Rate 80 bpm    Systolic Blood Pressure 126 mmHg    Diastolic Blood Pressure 58 mmHg  LOW    Mean Arterial Pressure 81 mmHg    BP Site Right arm    BP Method Manual    Oxygen Saturation 93 %  LOW      Measurements from flowsheet : Measurements    7/20/2021 10:41 AM CDT   Height/Length Estimated   67 in     General:  Alert and oriented, No acute distress.    HENT:  Normocephalic, Tympanic membranes are clear.    Neck:  Supple, Non-tender.    Respiratory:  Lungs are clear to auscultation, Respirations are non-labored.    Cardiovascular:  Normal rate, Regular rhythm.    Gastrointestinal:  Soft, Non-tender, Non-distended, Normal bowel sounds.    Musculoskeletal:  Normal range of motion, Normal strength, No tenderness.    Integumentary:  Warm, Dry, Pink.    Feet:  Normal by visual exam, Normal pulses, Sensation intact, By monofilament exam.    Neurologic:  Alert, Oriented, Normal sensory, Normal motor function.       Review / Management   Results review:  Lab results   7/13/2021 7:58 AM CDT WBC TR 8.07 x10^3/uL     Hgb TR 7.7 g/dL     Hct TR 26.4 %     Platelet  x10^3/uL    7/8/2021 3:30 PM CDT Sodium Level 140 mmol/L     Potassium Level 4.0 mmol/L     Chloride Level 103 mmol/L     CO2 Level 29 mmol/L     Glucose Level 109 mg/dL  HI     BUN 13 mg/dL     Creatinine 0.73 mg/dL     BUN/Creat Ratio NOT APPLICABLE     eGFR 90 mL/min/1.73m2     eGFR African American 104 mL/min/1.73m2     Calcium Level 8.9 mg/dL    7/8/2021 1:02 PM CDT WBC TR 8.47 x10^3/uL     Hgb TR 7.7 g/dL     Hct TR 26.1 %     Platelet  x10^3/uL    6/8/2021 11:17 AM CDT WBC 7.9     RBC 3.60  LOW     Hgb 8.4 gm/dL  LOW     Hct 27.2 %  LOW     MCV 75.6 fL  LOW     MCH 23.3 pg  LOW     MCHC 30.9 gm/dL  LOW     RDW 21.8 %  HI     Platelet 246     MPV 8.9 fL     Lymphs 5.2 %     Abs Lymphs 411  LOW     Neutrophils 90.8 %     Abs Neutrophils 7,173     Monocytes 3.5 %     Abs Monocytes 277     Eosinophils 0.4 %     Abs Eosinophils 32     Basophils 0.1 %     Abs Basophils 8     Diff Comment See comment    5/19/2021 1:49 PM CDT WBC 9.2     RBC 3.53  LOW     Hgb 8.7 gm/dL  LOW     Hct 27.9 %  LOW     MCV 79.0 fL  LOW     MCH 24.6 pg  LOW     MCHC 31.2 gm/dL  LOW     RDW 18.7 %  HI     Platelet  307     MPV 10.1 fL     Lymphs 3.5 %     Abs Lymphs 322  LOW     Neutrophils 94.3 %     Abs Neutrophils 8,676  HI     Monocytes 2.1 %     Abs Monocytes 193  LOW     Eosinophils 0.0 %     Abs Eosinophils 0  LOW     Basophils 0.1 %     Abs Basophils 9    5/6/2021 10:23 AM CDT WBC 19.6  HI (Modified)    RBC 3.04  LOW (Modified)    Hgb 7.0 gm/dL  LOW (Modified)    Hct 22.0 %  LOW (Modified)    MCV 72.4 fL  LOW (Modified)    MCH 23.0 pg  LOW (Modified)    MCHC 31.8 gm/dL  LOW (Modified)    RDW 23.1 %  HI (Modified)    Platelet 288 (Modified)    MPV 9.6 fL (Modified)    Lymphs 3.0 % (Modified)    Abs Lymphs 588  LOW (Modified)    React Lymphocytes See comment % (Modified)    Neutrophils 92.6 % (Modified)    Abs Neutrophils 18,150  HI (Modified)    Monocytes 4.1 % (Modified)    Abs Monocytes 804 (Modified)    Eosinophils 0.2 % (Modified)    Abs Eosinophils 39 (Modified)    Basophils 0.1 % (Modified)    Abs Basophils 20 (Modified)    Band Neutrophils => Indicates changed result(s) or information. % (Modified)    Abs Band Neutrophils See comment (Modified)    Metamyelocytes => Indicates changed result(s) or information. % (Modified)    Abs Metamyelocytes See comment (Modified)    Myelocytes => Indicates changed result(s) or information. % (Modified)    Abs Myelocytes See comment (Modified)    Promyelocytes => Indicates changed result(s) or information. % (Modified)    Abs Promyelocytes See comment (Modified)    Blasts => Indicates changed result(s) or information. % (Modified)    Abs Blasts See comment (Modified)    NRBC See comment (Modified)    Abs NRBC See comment (Modified)    Diff Comment See comment (Modified)   .       Impression and Plan   Diagnosis     Type 2 diabetes mellitus without complications (PYN65-JA E11.9).     Course:  Worsening.    Orders     Orders (Selected)   Prescriptions  Prescribed  MetFORMIN (Eqv-Glucophage XR) 500 mg oral tablet, extended release: = 2 tab(s) ( 1,000 mg ), Oral, bid, # 360 tab(s),  0 Refill(s), Type: Maintenance, Pharmacy: [facility], 2 tab(s) Oral bid, 67, in, 05/28/20 8:57:00 CDT, Height Measured, 198, lb, 04/02/21 9:34:00 CDT, Weight Measured.     Diagnosis     Anemia (ZZY18-IA D64.9).     Atherosclerotic heart disease of native coronary artery without angina pectoris (EKP34-UE I25.10).     Course:  Progressing as expected.    Plan:       Diet: Iron-rich diet, Hgb 8.0.    Orders     Orders   Requests (Return to Office):  Return to Clinic (Request) (Order): Return in 1 month.   No

## 2022-07-08 ENCOUNTER — OUTPATIENT (OUTPATIENT)
Dept: INPATIENT UNIT | Facility: HOSPITAL | Age: 32
LOS: 1 days | End: 2022-07-08
Payer: COMMERCIAL

## 2022-07-08 VITALS
RESPIRATION RATE: 18 BRPM | SYSTOLIC BLOOD PRESSURE: 126 MMHG | TEMPERATURE: 99 F | HEART RATE: 82 BPM | DIASTOLIC BLOOD PRESSURE: 64 MMHG

## 2022-07-08 VITALS — DIASTOLIC BLOOD PRESSURE: 53 MMHG | SYSTOLIC BLOOD PRESSURE: 115 MMHG | HEART RATE: 59 BPM

## 2022-07-08 DIAGNOSIS — O47.03 FALSE LABOR BEFORE 37 COMPLETED WEEKS OF GESTATION, THIRD TRIMESTER: ICD-10-CM

## 2022-07-08 DIAGNOSIS — Z98.890 OTHER SPECIFIED POSTPROCEDURAL STATES: Chronic | ICD-10-CM

## 2022-07-08 PROBLEM — M51.26 OTHER INTERVERTEBRAL DISC DISPLACEMENT, LUMBAR REGION: Chronic | Status: ACTIVE | Noted: 2020-02-17

## 2022-07-08 LAB
ALBUMIN SERPL ELPH-MCNC: 3.5 G/DL — SIGNIFICANT CHANGE UP (ref 3.3–5.2)
ALP SERPL-CCNC: 117 U/L — SIGNIFICANT CHANGE UP (ref 40–120)
ALT FLD-CCNC: 11 U/L — SIGNIFICANT CHANGE UP
AMYLASE P1 CFR SERPL: 25 U/L — LOW (ref 36–128)
ANION GAP SERPL CALC-SCNC: 12 MMOL/L — SIGNIFICANT CHANGE UP (ref 5–17)
APPEARANCE UR: CLEAR — SIGNIFICANT CHANGE UP
AST SERPL-CCNC: 17 U/L — SIGNIFICANT CHANGE UP
BACTERIA # UR AUTO: ABNORMAL
BASOPHILS # BLD AUTO: 0.04 K/UL — SIGNIFICANT CHANGE UP (ref 0–0.2)
BASOPHILS NFR BLD AUTO: 0.4 % — SIGNIFICANT CHANGE UP (ref 0–2)
BILIRUB SERPL-MCNC: 0.3 MG/DL — LOW (ref 0.4–2)
BILIRUB UR-MCNC: NEGATIVE — SIGNIFICANT CHANGE UP
BUN SERPL-MCNC: 5.9 MG/DL — LOW (ref 8–20)
CALCIUM SERPL-MCNC: 8.5 MG/DL — LOW (ref 8.6–10.2)
CHLORIDE SERPL-SCNC: 102 MMOL/L — SIGNIFICANT CHANGE UP (ref 98–107)
CO2 SERPL-SCNC: 20 MMOL/L — LOW (ref 22–29)
COLOR SPEC: YELLOW — SIGNIFICANT CHANGE UP
CREAT SERPL-MCNC: 0.38 MG/DL — LOW (ref 0.5–1.3)
DIFF PNL FLD: ABNORMAL
EGFR: 136 ML/MIN/1.73M2 — SIGNIFICANT CHANGE UP
EOSINOPHIL # BLD AUTO: 0.01 K/UL — SIGNIFICANT CHANGE UP (ref 0–0.5)
EOSINOPHIL NFR BLD AUTO: 0.1 % — SIGNIFICANT CHANGE UP (ref 0–6)
EPI CELLS # UR: ABNORMAL
GLUCOSE SERPL-MCNC: 122 MG/DL — HIGH (ref 70–99)
GLUCOSE UR QL: NEGATIVE MG/DL — SIGNIFICANT CHANGE UP
HCT VFR BLD CALC: 37.9 % — SIGNIFICANT CHANGE UP (ref 34.5–45)
HGB BLD-MCNC: 13 G/DL — SIGNIFICANT CHANGE UP (ref 11.5–15.5)
IMM GRANULOCYTES NFR BLD AUTO: 2.5 % — HIGH (ref 0–1.5)
KETONES UR-MCNC: ABNORMAL
LEUKOCYTE ESTERASE UR-ACNC: ABNORMAL
LIDOCAIN IGE QN: 38 U/L — SIGNIFICANT CHANGE UP (ref 22–51)
LYMPHOCYTES # BLD AUTO: 1.02 K/UL — SIGNIFICANT CHANGE UP (ref 1–3.3)
LYMPHOCYTES # BLD AUTO: 11 % — LOW (ref 13–44)
MCHC RBC-ENTMCNC: 32 PG — SIGNIFICANT CHANGE UP (ref 27–34)
MCHC RBC-ENTMCNC: 34.3 GM/DL — SIGNIFICANT CHANGE UP (ref 32–36)
MCV RBC AUTO: 93.3 FL — SIGNIFICANT CHANGE UP (ref 80–100)
MONOCYTES # BLD AUTO: 0.78 K/UL — SIGNIFICANT CHANGE UP (ref 0–0.9)
MONOCYTES NFR BLD AUTO: 8.4 % — SIGNIFICANT CHANGE UP (ref 2–14)
NEUTROPHILS # BLD AUTO: 7.22 K/UL — SIGNIFICANT CHANGE UP (ref 1.8–7.4)
NEUTROPHILS NFR BLD AUTO: 77.6 % — HIGH (ref 43–77)
NITRITE UR-MCNC: NEGATIVE — SIGNIFICANT CHANGE UP
PH UR: 6 — SIGNIFICANT CHANGE UP (ref 5–8)
PLATELET # BLD AUTO: 179 K/UL — SIGNIFICANT CHANGE UP (ref 150–400)
POTASSIUM SERPL-MCNC: 4 MMOL/L — SIGNIFICANT CHANGE UP (ref 3.5–5.3)
POTASSIUM SERPL-SCNC: 4 MMOL/L — SIGNIFICANT CHANGE UP (ref 3.5–5.3)
PROT SERPL-MCNC: 6.6 G/DL — SIGNIFICANT CHANGE UP (ref 6.6–8.7)
PROT UR-MCNC: NEGATIVE — SIGNIFICANT CHANGE UP
RAPID RVP RESULT: SIGNIFICANT CHANGE UP
RBC # BLD: 4.06 M/UL — SIGNIFICANT CHANGE UP (ref 3.8–5.2)
RBC # FLD: 12 % — SIGNIFICANT CHANGE UP (ref 10.3–14.5)
RBC CASTS # UR COMP ASSIST: SIGNIFICANT CHANGE UP /HPF (ref 0–4)
SARS-COV-2 RNA SPEC QL NAA+PROBE: SIGNIFICANT CHANGE UP
SODIUM SERPL-SCNC: 134 MMOL/L — LOW (ref 135–145)
SP GR SPEC: 1.01 — SIGNIFICANT CHANGE UP (ref 1.01–1.02)
UROBILINOGEN FLD QL: NEGATIVE MG/DL — SIGNIFICANT CHANGE UP
WBC # BLD: 9.3 K/UL — SIGNIFICANT CHANGE UP (ref 3.8–10.5)
WBC # FLD AUTO: 9.3 K/UL — SIGNIFICANT CHANGE UP (ref 3.8–10.5)
WBC UR QL: ABNORMAL /HPF (ref 0–5)

## 2022-07-08 PROCEDURE — 0225U NFCT DS DNA&RNA 21 SARSCOV2: CPT

## 2022-07-08 PROCEDURE — 99234 HOSP IP/OBS SM DT SF/LOW 45: CPT

## 2022-07-08 PROCEDURE — 76705 ECHO EXAM OF ABDOMEN: CPT | Mod: 26

## 2022-07-08 PROCEDURE — 82150 ASSAY OF AMYLASE: CPT

## 2022-07-08 PROCEDURE — 59025 FETAL NON-STRESS TEST: CPT

## 2022-07-08 PROCEDURE — 83690 ASSAY OF LIPASE: CPT

## 2022-07-08 PROCEDURE — 76705 ECHO EXAM OF ABDOMEN: CPT

## 2022-07-08 PROCEDURE — 93005 ELECTROCARDIOGRAM TRACING: CPT

## 2022-07-08 PROCEDURE — 93010 ELECTROCARDIOGRAM REPORT: CPT

## 2022-07-08 PROCEDURE — G0463: CPT

## 2022-07-08 PROCEDURE — 36415 COLL VENOUS BLD VENIPUNCTURE: CPT

## 2022-07-08 PROCEDURE — 80053 COMPREHEN METABOLIC PANEL: CPT

## 2022-07-08 PROCEDURE — 81001 URINALYSIS AUTO W/SCOPE: CPT

## 2022-07-08 PROCEDURE — 85025 COMPLETE CBC W/AUTO DIFF WBC: CPT

## 2022-07-08 RX ORDER — ACETAMINOPHEN 500 MG
975 TABLET ORAL ONCE
Refills: 0 | Status: COMPLETED | OUTPATIENT
Start: 2022-07-08 | End: 2022-07-08

## 2022-07-08 RX ADMIN — Medication 975 MILLIGRAM(S): at 04:07

## 2022-07-08 NOTE — OB PROVIDER TRIAGE NOTE - HISTORY OF PRESENT ILLNESS
PARK WEN is a 32y  at 31w6d who presents to L&D for RUQ pain and right chest pain radiating to the right shoulder, vomiting. On -, she started experiencing sharp chest pain just under her right breast and RUQ pain radiating to shoulder as well as chills. On , she vomited about 5-6 times without bile or blood. Yesterday, she was able to keep liquids and solids down, but she did have one episode of diarrhea. Overnight, her pain got worse about 6/10 at rest, 7/10 with laying on back, laying on right, and deep breathing. The pain is not affected by eating.    She denies vaginal bleeding, contractions and leakage of fluid. She endorses good fetal movement. She denies headaches, visual disturbances, dysuria, fevers, shortness of breath, chest pain.    Pregnancy course is uncomplicated.   POB:  2020  PGYN: -fibroids/-cysts, denies abnormal PAPs  PMH: denies  PSH: denies  Meds: PNV  All: NKDA

## 2022-07-08 NOTE — OB PROVIDER TRIAGE NOTE - NSOBPROVIDERNOTE_OBGYN_ALL_OB_FT
A/P: 32y  at 31w6d who presents to L&D for RUQ pain and right chest pain radiating to the right shoulder, vomiting.    -Fetus: Reactive and reassuring  -[place]: None  -Ordered CBC, CMP, amylase, lipase, U/A  -Ordered RUQ U/S  -Dispo: Continue to observe.    Will discuss with Dr. Maravilla A/P: 32y  at 31w6d who presents to L&D for RUQ pain and right chest pain radiating to the right shoulder, vomiting.    -Fetus: Reactive and reassuring  -[place]: None  -Ordered CBC, CMP, amylase, lipase, U/A  -Ordered RUQ U/S  -Dispo: Continue to observe.    Will discuss with Dr. Maravilla    Addendum:    Subjective Hx, Physical Exam, Laboratory & Imaging results reviewed.  I agree with the Resident Physician's assessment and plan of care, as discussed above.  Call, follow up, and return to Hospital parameters reviewed at length.  She was given the opportunity to ask questions and all were addressed.  Discharge home    Agusto Maravilla, DO

## 2022-07-08 NOTE — OB RN TRIAGE NOTE - FALL HARM RISK - RISK INTERVENTIONS

## 2022-07-08 NOTE — OB PROVIDER TRIAGE NOTE - NSHPPHYSICALEXAM_GEN_ALL_CORE
T(C): 37.1 (07-08-22 @ 02:33), Max: 37.1 (07-08-22 @ 02:33)  HR: 82 (07-08-22 @ 02:36) (82 - 82)  BP: 126/64 (07-08-22 @ 02:36) (126/64 - 126/64)  RR: 18 (07-08-22 @ 02:33) (18 - 18)    Gen: NAD, well-appearing  Heart: RRR  Lungs: CTAB  Abd: soft, gravid, TTP in RUQ radiating to R shoulder     FHT: 140/mod/+accels/-decels   [place]: none

## 2022-08-24 ENCOUNTER — OUTPATIENT (OUTPATIENT)
Dept: INPATIENT UNIT | Facility: HOSPITAL | Age: 32
LOS: 1 days | End: 2022-08-24

## 2022-08-24 VITALS
TEMPERATURE: 98 F | RESPIRATION RATE: 14 BRPM | HEART RATE: 62 BPM | SYSTOLIC BLOOD PRESSURE: 121 MMHG | DIASTOLIC BLOOD PRESSURE: 70 MMHG

## 2022-08-24 VITALS — TEMPERATURE: 98 F

## 2022-08-24 DIAGNOSIS — O47.1 FALSE LABOR AT OR AFTER 37 COMPLETED WEEKS OF GESTATION: ICD-10-CM

## 2022-08-24 DIAGNOSIS — Z98.890 OTHER SPECIFIED POSTPROCEDURAL STATES: Chronic | ICD-10-CM

## 2022-08-24 NOTE — OB RN TRIAGE NOTE - IN THE PAST 12 MONTHS HAVE YOU USED DRUGS OTHER THAN THOSE REQUIRED FOR MEDICAL REASON?
Bed: IN02  Expected date:   Expected time:   Means of arrival:   Comments:  59F   Sudden hearing loss 12/23   Needs MRI with contrast.   
No

## 2022-08-24 NOTE — OB PROVIDER TRIAGE NOTE - HISTORY OF PRESENT ILLNESS
PARK WEN is a 32y GP at _ weeks GA who presents to L&D for .     Pt denies vaginal bleeding, contractions and leakage of fluid. She endorses good fetal movement.     Pt denies trauma. Her last cervical exam was . Last sexual intercourse was .     Pt denies headaches, visual disturbances, RUQ pain, epigastric pain and new-onset edema.     She denies any urinary complaints.     She denies fevers, chills, nausea, vomiting.     Pregnancy course is  uncomplicated.   Pregnancy course is significant for:    POB:  PGYN: -fibroids/-cysts, denied STD hx, denies abnormal PAPs  PMH:  PSH:  SH: Denies tobacco use, EtOH use and illicit drug use during the pregnancy; Feels safe at home  Meds:  All:    T(C): 36.7 (08-24-22 @ 16:05), Max: 36.7 (08-24-22 @ 15:39)  HR: 62 (08-24-22 @ 16:05) (62 - 62)  BP: 121/70 (08-24-22 @ 16:05) (121/70 - 121/70)  RR: 14 (08-24-22 @ 16:05) (14 - 14)  SpO2: --  Gen: NAD, well-appearing  Heart: RRR  Lungs: CTAB  Abd: soft, gravid  Ext: non-edematous, non-tender   SVE:   SSE: cervix visualized, closed and without any signs of bleeding or drainage, no pooling   FHT: __ baseline, moderate variability, +accels, no decels  [place]: ctx q __ mins  Bed side sono:     A/P:     Fetus:  [place]:  Dispo: Continue to observe.     Discussed with    PARK WEN is a 32y  at 38.4 weeks GA who presents to L&D for contractions 5/10 at worse, irregular.   Pt denies vaginal bleeding, and leakage of fluid. She endorses good fetal movement.   She denies any urinary complaints.   She denies fevers, chills, nausea, vomiting.     Pregnancy course is  uncomplicated.       POB: FT-- uncomplicated   PGYN: -fibroids/-cysts, denied STD hx, denies abnormal PAPs  PMH: none  PSH: herniated disc   SH: Denies tobacco use, EtOH use and illicit drug use during the pregnancy; Feels safe at home  Meds: PNV  All: NKDA

## 2022-08-24 NOTE — OB PROVIDER TRIAGE NOTE - NSHPPHYSICALEXAM_GEN_ALL_CORE
T(C): 36.7 (08-24-22 @ 16:05), Max: 36.7 (08-24-22 @ 15:39)  HR: 62 (08-24-22 @ 16:05) (62 - 62)  BP: 121/70 (08-24-22 @ 16:05) (121/70 - 121/70)  RR: 14 (08-24-22 @ 16:05) (14 - 14)  Gen: NAD, well-appearing  Heart: RRR  Lungs: CTAB  Abd: soft, gravid  Ext: non-edematous, non-tender   SVE: 0.5/50/-3    FHT: 120 baseline, moderate variability, +accels, no decels  [place]: ctx q 2 mins

## 2022-08-24 NOTE — OB PROVIDER TRIAGE NOTE - NSOBPROVIDERNOTE_OBGYN_ALL_OB_FT
A/P:   PARK WEN is a 32y  at 38.4 weeks GA who presents to L&D for contractions 5/10 at worse, irregular. Pt not in labor at this time.   NST reactive  Non painful ctx  Stable for d/c home. Labor precautions reviewed. Pt with appointment on Friday.     Discussed with Dr. Nugent

## 2022-09-04 ENCOUNTER — INPATIENT (INPATIENT)
Facility: HOSPITAL | Age: 32
LOS: 1 days | Discharge: HOME | End: 2022-09-06
Payer: COMMERCIAL

## 2022-09-04 VITALS — TEMPERATURE: 98 F

## 2022-09-04 DIAGNOSIS — O26.893 OTHER SPECIFIED PREGNANCY RELATED CONDITIONS, THIRD TRIMESTER: ICD-10-CM

## 2022-09-04 DIAGNOSIS — O47.1 FALSE LABOR AT OR AFTER 37 COMPLETED WEEKS OF GESTATION: ICD-10-CM

## 2022-09-04 DIAGNOSIS — Z98.890 OTHER SPECIFIED POSTPROCEDURAL STATES: Chronic | ICD-10-CM

## 2022-09-04 LAB
BASOPHILS # BLD AUTO: 0.04 K/UL — SIGNIFICANT CHANGE UP (ref 0–0.2)
BASOPHILS NFR BLD AUTO: 0.3 % — SIGNIFICANT CHANGE UP (ref 0–2)
BLD GP AB SCN SERPL QL: SIGNIFICANT CHANGE UP
COVID-19 SPIKE DOMAIN AB INTERP: POSITIVE
COVID-19 SPIKE DOMAIN ANTIBODY RESULT: >250 U/ML — HIGH
EOSINOPHIL # BLD AUTO: 0.06 K/UL — SIGNIFICANT CHANGE UP (ref 0–0.5)
EOSINOPHIL NFR BLD AUTO: 0.4 % — SIGNIFICANT CHANGE UP (ref 0–6)
HCT VFR BLD CALC: 41.5 % — SIGNIFICANT CHANGE UP (ref 34.5–45)
HGB BLD-MCNC: 14.1 G/DL — SIGNIFICANT CHANGE UP (ref 11.5–15.5)
IMM GRANULOCYTES NFR BLD AUTO: 0.8 % — SIGNIFICANT CHANGE UP (ref 0–1.5)
LYMPHOCYTES # BLD AUTO: 18.2 % — SIGNIFICANT CHANGE UP (ref 13–44)
LYMPHOCYTES # BLD AUTO: 2.6 K/UL — SIGNIFICANT CHANGE UP (ref 1–3.3)
MCHC RBC-ENTMCNC: 32.3 PG — SIGNIFICANT CHANGE UP (ref 27–34)
MCHC RBC-ENTMCNC: 34 GM/DL — SIGNIFICANT CHANGE UP (ref 32–36)
MCV RBC AUTO: 95 FL — SIGNIFICANT CHANGE UP (ref 80–100)
MONOCYTES # BLD AUTO: 1.06 K/UL — HIGH (ref 0–0.9)
MONOCYTES NFR BLD AUTO: 7.4 % — SIGNIFICANT CHANGE UP (ref 2–14)
NEUTROPHILS # BLD AUTO: 10.39 K/UL — HIGH (ref 1.8–7.4)
NEUTROPHILS NFR BLD AUTO: 72.9 % — SIGNIFICANT CHANGE UP (ref 43–77)
PLATELET # BLD AUTO: 164 K/UL — SIGNIFICANT CHANGE UP (ref 150–400)
RBC # BLD: 4.37 M/UL — SIGNIFICANT CHANGE UP (ref 3.8–5.2)
RBC # FLD: 12.9 % — SIGNIFICANT CHANGE UP (ref 10.3–14.5)
SARS-COV-2 IGG+IGM SERPL QL IA: >250 U/ML — HIGH
SARS-COV-2 IGG+IGM SERPL QL IA: POSITIVE
SARS-COV-2 RNA SPEC QL NAA+PROBE: SIGNIFICANT CHANGE UP
WBC # BLD: 14.27 K/UL — HIGH (ref 3.8–10.5)
WBC # FLD AUTO: 14.27 K/UL — HIGH (ref 3.8–10.5)

## 2022-09-04 RX ORDER — OXYTOCIN 10 UNIT/ML
333.33 VIAL (ML) INJECTION
Qty: 20 | Refills: 0 | Status: COMPLETED | OUTPATIENT
Start: 2022-09-04 | End: 2022-09-04

## 2022-09-04 RX ORDER — DIBUCAINE 1 %
1 OINTMENT (GRAM) RECTAL EVERY 6 HOURS
Refills: 0 | Status: DISCONTINUED | OUTPATIENT
Start: 2022-09-04 | End: 2022-09-06

## 2022-09-04 RX ORDER — OXYTOCIN 10 UNIT/ML
2 VIAL (ML) INJECTION
Qty: 30 | Refills: 0 | Status: DISCONTINUED | OUTPATIENT
Start: 2022-09-04 | End: 2022-09-06

## 2022-09-04 RX ORDER — TETANUS TOXOID, REDUCED DIPHTHERIA TOXOID AND ACELLULAR PERTUSSIS VACCINE, ADSORBED 5; 2.5; 8; 8; 2.5 [IU]/.5ML; [IU]/.5ML; UG/.5ML; UG/.5ML; UG/.5ML
0.5 SUSPENSION INTRAMUSCULAR ONCE
Refills: 0 | Status: DISCONTINUED | OUTPATIENT
Start: 2022-09-04 | End: 2022-09-06

## 2022-09-04 RX ORDER — OXYCODONE HYDROCHLORIDE 5 MG/1
5 TABLET ORAL ONCE
Refills: 0 | Status: DISCONTINUED | OUTPATIENT
Start: 2022-09-04 | End: 2022-09-06

## 2022-09-04 RX ORDER — KETOROLAC TROMETHAMINE 30 MG/ML
30 SYRINGE (ML) INJECTION ONCE
Refills: 0 | Status: DISCONTINUED | OUTPATIENT
Start: 2022-09-04 | End: 2022-09-04

## 2022-09-04 RX ORDER — SIMETHICONE 80 MG/1
80 TABLET, CHEWABLE ORAL EVERY 4 HOURS
Refills: 0 | Status: DISCONTINUED | OUTPATIENT
Start: 2022-09-04 | End: 2022-09-06

## 2022-09-04 RX ORDER — AER TRAVELER 0.5 G/1
1 SOLUTION RECTAL; TOPICAL EVERY 4 HOURS
Refills: 0 | Status: DISCONTINUED | OUTPATIENT
Start: 2022-09-04 | End: 2022-09-06

## 2022-09-04 RX ORDER — CHLORHEXIDINE GLUCONATE 213 G/1000ML
1 SOLUTION TOPICAL ONCE
Refills: 0 | Status: DISCONTINUED | OUTPATIENT
Start: 2022-09-04 | End: 2022-09-04

## 2022-09-04 RX ORDER — SODIUM CHLORIDE 9 MG/ML
3 INJECTION INTRAMUSCULAR; INTRAVENOUS; SUBCUTANEOUS EVERY 8 HOURS
Refills: 0 | Status: DISCONTINUED | OUTPATIENT
Start: 2022-09-04 | End: 2022-09-06

## 2022-09-04 RX ORDER — OXYTOCIN 10 UNIT/ML
333.33 VIAL (ML) INJECTION
Qty: 20 | Refills: 0 | Status: DISCONTINUED | OUTPATIENT
Start: 2022-09-04 | End: 2022-09-06

## 2022-09-04 RX ORDER — ACETAMINOPHEN 500 MG
2 TABLET ORAL
Qty: 24 | Refills: 0
Start: 2022-09-04 | End: 2022-09-06

## 2022-09-04 RX ORDER — CITRIC ACID/SODIUM CITRATE 300-500 MG
30 SOLUTION, ORAL ORAL ONCE
Refills: 0 | Status: DISCONTINUED | OUTPATIENT
Start: 2022-09-04 | End: 2022-09-04

## 2022-09-04 RX ORDER — IBUPROFEN 200 MG
600 TABLET ORAL EVERY 6 HOURS
Refills: 0 | Status: COMPLETED | OUTPATIENT
Start: 2022-09-04 | End: 2023-08-03

## 2022-09-04 RX ORDER — MAGNESIUM HYDROXIDE 400 MG/1
30 TABLET, CHEWABLE ORAL
Refills: 0 | Status: DISCONTINUED | OUTPATIENT
Start: 2022-09-04 | End: 2022-09-06

## 2022-09-04 RX ORDER — SODIUM CHLORIDE 9 MG/ML
1000 INJECTION, SOLUTION INTRAVENOUS
Refills: 0 | Status: DISCONTINUED | OUTPATIENT
Start: 2022-09-04 | End: 2022-09-04

## 2022-09-04 RX ORDER — HYDROCORTISONE 1 %
1 OINTMENT (GRAM) TOPICAL EVERY 6 HOURS
Refills: 0 | Status: DISCONTINUED | OUTPATIENT
Start: 2022-09-04 | End: 2022-09-06

## 2022-09-04 RX ORDER — IBUPROFEN 200 MG
600 TABLET ORAL EVERY 6 HOURS
Refills: 0 | Status: DISCONTINUED | OUTPATIENT
Start: 2022-09-04 | End: 2022-09-06

## 2022-09-04 RX ORDER — LANOLIN
1 OINTMENT (GRAM) TOPICAL EVERY 6 HOURS
Refills: 0 | Status: DISCONTINUED | OUTPATIENT
Start: 2022-09-04 | End: 2022-09-06

## 2022-09-04 RX ORDER — PRAMOXINE HYDROCHLORIDE 150 MG/15G
1 AEROSOL, FOAM RECTAL EVERY 4 HOURS
Refills: 0 | Status: DISCONTINUED | OUTPATIENT
Start: 2022-09-04 | End: 2022-09-06

## 2022-09-04 RX ORDER — OXYCODONE HYDROCHLORIDE 5 MG/1
5 TABLET ORAL
Refills: 0 | Status: DISCONTINUED | OUTPATIENT
Start: 2022-09-04 | End: 2022-09-06

## 2022-09-04 RX ORDER — DIPHENHYDRAMINE HCL 50 MG
25 CAPSULE ORAL EVERY 6 HOURS
Refills: 0 | Status: DISCONTINUED | OUTPATIENT
Start: 2022-09-04 | End: 2022-09-06

## 2022-09-04 RX ORDER — IBUPROFEN 200 MG
1 TABLET ORAL
Qty: 12 | Refills: 0
Start: 2022-09-04 | End: 2022-09-06

## 2022-09-04 RX ORDER — ACETAMINOPHEN 500 MG
975 TABLET ORAL
Refills: 0 | Status: DISCONTINUED | OUTPATIENT
Start: 2022-09-04 | End: 2022-09-06

## 2022-09-04 RX ORDER — BENZOCAINE 10 %
1 GEL (GRAM) MUCOUS MEMBRANE EVERY 6 HOURS
Refills: 0 | Status: DISCONTINUED | OUTPATIENT
Start: 2022-09-04 | End: 2022-09-06

## 2022-09-04 RX ADMIN — Medication 975 MILLIGRAM(S): at 21:30

## 2022-09-04 RX ADMIN — Medication 600 MILLIGRAM(S): at 18:18

## 2022-09-04 RX ADMIN — Medication 975 MILLIGRAM(S): at 21:16

## 2022-09-04 RX ADMIN — Medication 1000 MILLIUNIT(S)/MIN: at 11:28

## 2022-09-04 RX ADMIN — Medication 975 MILLIGRAM(S): at 15:45

## 2022-09-04 RX ADMIN — SODIUM CHLORIDE 125 MILLILITER(S): 9 INJECTION, SOLUTION INTRAVENOUS at 03:40

## 2022-09-04 RX ADMIN — Medication 600 MILLIGRAM(S): at 23:16

## 2022-09-04 RX ADMIN — Medication 30 MILLIGRAM(S): at 11:30

## 2022-09-04 RX ADMIN — Medication 600 MILLIGRAM(S): at 23:00

## 2022-09-04 RX ADMIN — SODIUM CHLORIDE 125 MILLILITER(S): 9 INJECTION, SOLUTION INTRAVENOUS at 04:40

## 2022-09-04 RX ADMIN — Medication 30 MILLIGRAM(S): at 11:45

## 2022-09-04 NOTE — OB PROVIDER H&P - NSPPHNORISK_OBGYN_ALL_OB
Mercedes Flap Text: The defect edges were debeveled with a #15 scalpel blade.  Given the location of the defect, shape of the defect and the proximity to free margins a Mercedes flap was deemed most appropriate.  Using a sterile surgical marker, an appropriate advancement flap was drawn incorporating the defect and placing the expected incisions within the relaxed skin tension lines where possible. The area thus outlined was incised deep to adipose tissue with a #15 scalpel blade.  The skin margins were undermined to an appropriate distance in all directions utilizing iris scissors. In my judgment no risk for PPH has been identified at this time.

## 2022-09-04 NOTE — OB PROVIDER DELIVERY SUMMARY - NSPOSITIONATDELIA_OBGYN_ALL_OB
Left Occiput Anterior Normal rate, regular rhythm.  Heart sounds S1, S2.  No murmurs, rubs or gallops.

## 2022-09-04 NOTE — OB PROVIDER LABOR PROGRESS NOTE - NS_SUBJECTIVE/OBJECTIVE_OBGYN_ALL_OB_FT
Patient is in labor at 40w1d  FHR: baseline 120, mod variability, +accels, -decels  [place]: contractions q 2 minutes    Plan  - will continue expectant management

## 2022-09-04 NOTE — OB RN PATIENT PROFILE - NS PRO RUBELLA RECEIVED Y/N
"Encounter Date: 2/18/2018       History     Chief Complaint   Patient presents with    Shoulder Pain     right shoulder pain s/p football injury yesterday. dx contusion at [facility], xr done. mother states pain worsening, "they didn't even give him a prescription". states has not given any tylenol/motrin for pain today     Angelika reports he was hit on the R side playing football yesterday. Remembers all events, no LOC. Reports R shoulder pain, moderate, worsens with any movement of shoulder and is constant. Mom took him to  yesterday and was told xrays were negative but he continues to have pain and doesn't want to move shoulder. No home meds taken for pain. No other injuries or complaints. No numbness or tingling.       The history is provided by the patient and the mother.     Review of patient's allergies indicates:  No Known Allergies  Past Medical History:   Diagnosis Date    ADHD      Past Surgical History:   Procedure Laterality Date    CIRCUMCISION      x2     No family history on file.  Social History   Substance Use Topics    Smoking status: Not on file    Smokeless tobacco: Not on file    Alcohol use Not on file     Review of Systems   Musculoskeletal:        Positive R shoulder pain   All other systems reviewed and are negative.      Physical Exam     Initial Vitals [02/18/18 0934]   BP Pulse Resp Temp SpO2   121/74 92 18 98.8 °F (37.1 °C) 100 %      MAP       89.67         Physical Exam    Nursing note and vitals reviewed.  Constitutional: He appears well-developed and well-nourished. He is not diaphoretic.   NAd, but uncomfortable when examining shoulder, R     Eyes: Conjunctivae and EOM are normal.   Neck: Normal range of motion. Neck supple.   Cardiovascular: Normal rate, regular rhythm and S1 normal.   No murmur heard.  Pulmonary/Chest: Effort normal and breath sounds normal. No respiratory distress. Air movement is not decreased. He exhibits no retraction.   Musculoskeletal: He exhibits " tenderness. He exhibits no edema or deformity.   Pain at AC joint and over R trapezius. No point scapular pain. No point ttp over clavicle. Hold shoulder slumped anteriorly. No deformity. Normal perfusion. Normal elbow and wrist.    Neurological: He is alert. No cranial nerve deficit or sensory deficit.   Skin: Skin is warm and dry. Capillary refill takes less than 2 seconds.         ED Course   Procedures  Labs Reviewed - No data to display          Medical Decision Making:   Clinical Tests:   Radiological Study: Ordered and Reviewed  ED Management:  No fracture seen on x-ray.  I spoke extensively with mom.  Sling given.  Motrin given.  He feels a little better.  Mom wants to go home and return tomorrow or see pediatrician if he is still hurting then. I offered further imaging w CT bc he seems to be in moderate pain w ranging and she has declined. Wants to let him rest for a day. He is stable for discharge.  We discussed home care and worrisome signs that should probably need to return to the ER should they occur.  There is no indication for further emergent intervention or evaluation at this time.                      Clinical Impression:   The encounter diagnosis was Acute pain of right shoulder.                           Bony Kim MD  02/26/18 0208     yes...

## 2022-09-04 NOTE — OB RN DELIVERY SUMMARY - NSSELHIDDEN_OBGYN_ALL_OB_FT
[NS_DeliveryAttending1_OBGYN_ALL_OB_FT:[id number]=],[NS_DeliveryRN_OBGYN_ALL_OB_FT:[id number]=]

## 2022-09-04 NOTE — OB RN DELIVERY SUMMARY - NS_SEPSISRSKCALC_OBGYN_ALL_OB_FT
EOS calculated successfully. EOS Risk Factor: 0.5/1000 live births ([facility] national incidence); GA=40w1d; Temp=98.6; ROM=8.217; GBS='Negative'; Antibiotics='No antibiotics or any antibiotics < 2 hrs prior to birth'

## 2022-09-04 NOTE — OB RN PATIENT PROFILE - BREASTFEEDING PROVIDES STABLE TEMPERATURE THROUGH SKIN TO SKIN CONTACT
Palliaive Care Progress Note    Subjective  IABP removed yesterday  This morning he denies pain and SOB. He is wanting to walk.       Objective      Vitals with min/max:      Vital Last Value 24 Hour Range   Temperature 97.7 °F (36.5 °C) (02/14/20 0805) Temp  Min: 97.3 °F (36.3 °C)  Max: 97.9 °F (36.6 °C)   Pulse (!) 108 (02/14/20 1000) Pulse  Min: 98  Max: 115   Respiratory (!) 26 (02/14/20 1000) Resp  Min: 11  Max: 41   Non-Invasive  Blood Pressure 95/65 (02/14/20 1000) BP  Min: 84/64  Max: 120/97   Pulse Oximetry 95 % (02/14/20 1000) SpO2  Min: 90 %  Max: 96 %   Arterial   Blood Pressure (!) 82/36 (02/11/20 1155) No data recorded         Physical Exam  Vitals signs and nursing note reviewed.   HENT:      Head: Normocephalic and atraumatic.      Mouth/Throat:      Mouth: Mucous membranes are moist.      Pharynx: Oropharynx is clear.   Eyes:      Pupils: Pupils are equal, round, and reactive to light.   Neck:      Musculoskeletal: Normal range of motion.   Cardiovascular:      Rate and Rhythm: Normal rate and regular rhythm.   Pulmonary:      Effort: Pulmonary effort is normal.      Breath sounds: Normal breath sounds.   Abdominal:      General: Bowel sounds are normal.      Palpations: Abdomen is soft.   Musculoskeletal: Normal range of motion.   Skin:     General: Skin is warm and dry.   Neurological:      Mental Status: He is alert and oriented to person, place, and time.   Psychiatric:         Mood and Affect: Mood normal.         Behavior: Behavior normal.         Labs     Recent Labs   Lab 02/14/20  0500  02/08/20  0445   WBC 12.9*   < > 18.6*   RBC 3.74*   < > 3.99*   HGB 11.9*   < > 12.9*   HCT 37.0*   < > 39.3      < > 301   NEUT  --   --  NOT APPLICABLE    < > = values in this interval not displayed.      Recent Labs   Lab 02/14/20  0500   SODIUM 136   POTASSIUM 4.1   CHLORIDE 102   CO2 29   BUN 26*   CREATININE 0.98   CALCIUM 9.3   ALBUMIN 2.8*   BILIRUBIN 0.6   ALKPT 118*   GPT 48   AST 41*    GLUCOSE 118*          Imaging    XR CHEST PA OR AP 1 VIEW   Final Result   Improving hemodynamic status with decreasing pulmonary vascular prominence.      Electronically Signed by: GILLIAN HICKS M.D.    Signed on: 2/13/2020 7:13 AM          XR CHEST PA OR AP 1 VIEW   Final Result      Moderate cardiomegaly.        Stable moderate vascular congestion.        Improved right basilar pulmonary aeration.        Stable retrocardiac atelectasis.        No definite focal lobar consolidation, pneumothorax..        Right IJ approach [place] catheter within the right main pulmonary artery.        IABP inferior to aortic knob.        Small bilateral pleural effusions.        Diffuse osseous demineralization.            Electronically Signed by: GAMA MILLER M.D.    Signed on: 2/11/2020 6:12 PM          XR CHEST PA OR AP 1 VIEW   Final Result   : There are more prominent increased lung markings suggesting further decompensation or worsening pulmonary venous congestion and interstitial edema.  Aortic balloon again noted with a metallic marker just inferior to the aortic knob region.      Electronically Signed by: GILLIAN HICKS M.D.    Signed on: 2/10/2020 6:02 PM          XR CHEST PA OR AP 1 VIEW   Final Result   Heart size is normal.  Marker from the aortic balloon pump seen in the proximal descending aorta.  There are diffuse increased lung markings suggesting pulmonary venous congestion and may be some interstitial edema.  Previous opacities in the    right upper lung has cleared.      Electronically Signed by: GILLIAN HICKS M.D.    Signed on: 2/9/2020 9:43 AM          XR CHEST PA OR AP 1 VIEW    (Results Pending)        Assessment  and Plan:   Vernon Gayle is a  68 year old male with NSTEMI and cardiogenic shock, status post intra-aortic balloon pump placement, on inotropes without significant improvement.    Palliative care by specialist  Continuing to follow to assist with goals of care    Shortdemar  of breath  -Due to ICM  -Previously relieved by Norco 10/325mg and Norco 5/325mg   -Would recommend IV morphine 3 mg q 30 mins PRN  -Low threshold for gtt initiation for comfort if requiring frequent PRN dosing    N STEMI-proximal diagonal artery stenosis             -Status post BLAZE placement 2/4             -Cardiology following     Cardiogenic shock: Status post intra-aortic balloon pump placement 2/4                                   -Cardiology following                                   -On milrinone drip                                   -Attempting to wean inotropes, recognizing symptoms will likely worsen. At that time would recommend shift in goals of care to comfort focused.      Ischemic cardiomyopathy             -Shortness of breath                        -relieved by norco 10/325mg q 6 hrs PRN (used x 3 in 24 hours)             -Overall Debility       Goals of care:  Rojelio is hoping to walk. He shares that with medical plans moving forward, he is putting his trust in decision making to his doctors. He states that he will follow their recommendations. I discussed that a shift in his focus of care, to exclusively comfort focused  Without continuing to treat underlying issues , might  Make sense. He again states that he will \"do whatever they tell me to do.\"  His friend Guillermo is at bedside. Guillermo requests that he be called if there are any changes to Rojelio's condition, or when the gtts are turned off. RN provided Guillermo with direct phone number to ICU.     Advance Care Planning:  HCPOA:Guillermo SIMON:None on file    Code Status: Selective Treatment/DNR     DW ICU team, AMALIA Muniz  Palliative Care Nurse Practitioner  2/14/2020 10:40 AM   Statement Selected

## 2022-09-04 NOTE — OB PROVIDER DELIVERY SUMMARY - NSSELHIDDEN_OBGYN_ALL_OB_FT
[NS_DeliveryAttending1_OBGYN_ALL_OB_FT:[email or web address]=],[NS_DeliveryRN_OBGYN_ALL_OB_FT:[id number]=],[NS_DeliveryAssist1_OBGYN_ALL_OB_FT:[id number]=]

## 2022-09-04 NOTE — OB PROVIDER H&P - HISTORY OF PRESENT ILLNESS
32y  at 40.1 weeks GA who presents to L&D for leakage of fluid. Leakage of fluid began at 0230, clear. Patient denies vaginal bleeding and contractions. She endorses good fetal movement. Denies fevers, chills, nausea and vomiting. No other complaints at this time.    Prenatal course uncomplicated.      POB: FT-- uncomplicated   PGYN: -fibroids/-cysts, denied STD hx, denies abnormal PAPs  PMH: none  PSH: herniated disc   SH: Denies tobacco use, EtOH use and illicit drug use during the pregnancy; Feels safe at home  Meds: PNV  All: NKDA

## 2022-09-04 NOTE — OB RN PATIENT PROFILE - BRAND OF COVID-19 VACCINATION
"    6/4/2020         RE: Hoda Rebolledo  [address]  [address]  [place] [address]        Dear Colleague,    Thank you for referring your patient, Hoda Rebolledo, to the [facility]. Please see a copy of my visit note below.    Hoda Rebolledo is a 77 year old female who is being evaluated via a billable telephone visit.      The patient has been notified of following:     \"This telephone visit will be conducted via a call between you and your physician/provider. We have found that certain health care needs can be provided without the need for a physical exam.  This service lets us provide the care you need with a short phone conversation.  If a prescription is necessary we can send it directly to your pharmacy.  If lab work is needed we can place an order for that and you can then stop by our lab to have the test done at a later time.    Telephone visits are billed at different rates depending on your insurance coverage. During this emergency period, for some insurers they may be billed the same as an in-person visit.  Please reach out to your insurance provider with any questions.    If during the course of the call the physician/provider feels a telephone visit is not appropriate, you will not be charged for this service.\"    Patient has given verbal consent for Telephone visit?  Yes    What phone number would you like to be contacted at? [phone]    How would you like to obtain your AVS? Chelsy Malik LPN on 6/4/2020 at 9:46 AM        Visit Date:   06/04/2020      This is a telephone followup visit because of the virus epidemic.  Call initiated time 10:22, call terminated time 10:32.      HISTORY OF PRESENT ILLNESS:  I saw the patient 3 months ago.  She has an essential tremor.  At the time of her last visit, I had increased her dose of propranolol from 80 mg long-acting to 160 mg daily.  She did not care for the dose increase.  She thought she was \"shaking more.\" "  She thinks it is possible that that was due to the stress related to the virus epidemic.  In any case, she went back to the 80 mg dose.  She feels like she is back to baseline.  She does have tremor and it is worse if she is anxious.  She has been on numerous treatments for tremor including lorazepam, primidone and gabapentin.  These have not been helpful.  I also had tried her on escitalopram, but that made the tremor worse.      She and her  live in a senior high [facility].  They are pretty much in a shutdown mode.  There are no activities and get out very little.  She is cooking more.  Her  and she are both involved in projects organizing their photo albums.      PHYSICAL EXAMINATION:  There is nothing to add on examination, as this is a telephone visit.      ASSESSMENT:  Essential tremor.      DISCUSSION:  This patient had a followup visit for essential tremor.  She is back on propranolol 80 mg daily.  She tolerates it and it seems to help somewhat.  She has no interest in making any modifications or changes to her regimen.  She is going to follow up with me on an as-needed basis.  I encouraged her to call if there were questions or problems.         BINU KENT MD             D: 2020   T: 2020   MT: WHITNEY      Name:     ABUNDIO PHIPPS   MRN:      [id number]        Account:      [id number]   :      1942           Visit Date:   2020      Document: [id number]       Again, thank you for allowing me to participate in the care of your patient.        Sincerely,        Binu Kent MD     Moderna dose 1 and 2

## 2022-09-04 NOTE — DISCHARGE NOTE OB - PATIENT PORTAL LINK FT
You can access the FollowMyHealth Patient Portal offered by [facility] by registering at the following website: [email or web address]. By joining [employer]’s FollowMyHealth portal, you will also be able to view your health information using other applications (apps) compatible with our system.

## 2022-09-04 NOTE — DISCHARGE NOTE OB - CARE PLAN
Principal Discharge DX:	 (normal spontaneous vaginal delivery)  Assessment and plan of treatment:	Patient underwent a normal spontaneous vaginal delivery. Post-partum course was uncomplicated. Pain is well controlled with PRN medication. She has no difficulty with ambulation, voiding, or PO intake. Lab values and vital signs are within normal limits prior to discharge.  Please contact your provider for any pain uncontrolled by medication, excessive bleeding or Fever>100.4  1) Please take ibuprofen as needed for pain as prescribed.  2) Nothing in the vagina for 6 weeks (including no sex, no tampons, and no douching).  3) Please call your doctor for a follow up your postpartum appointment in 4-6 weeks.  4) Please continue taking vitamins postpartum. Take iron and colace for acute blood loss anemia.  5) Please call the office sooner if you have heavy vaginal bleeding, severe abdominal pain, or fever > 100.4F.  6) You may resume regular daily activity as tolerated   1

## 2022-09-04 NOTE — OB PROVIDER H&P - ASSESSMENT
A/P: 32y  at 40.1 weeks GA who is being admitted for SROM in early labor.   -Admit to L&D  -Consent  -Admission labs  -NPO, except ice chips   -IV fluids  -Labor: SROM@0230, clear. Will start pitocin PRN.   -Fetus: Cat I tracing. Continuous toco and fetal monitoring.   -GBS: Negative, no GBS ppx required   -Analgesia: Will desire an epidural   -DVT ppx: Ambulate and SCD's while in bed     Discussed with Dr. Andrade.

## 2022-09-04 NOTE — OB PROVIDER DELIVERY SUMMARY - NSPROVIDERDELIVERYNOTE_OBGYN_ALL_OB_FT
at 1043 of a live male infant with Apgars 9/9. Delivered SAMMIE, loose nuchal cord x1. Infant's head delivered with maternal expulsive efforts. Shoulders delivered without difficulty followed by the rest of the body. Cord clamped and cut after delay. Cord gas samples obtained. Baby handed to patient. Placenta delivered spontaneously, intact at 1048. Pitocin started. Excellent hemostasis achieved. Cervix, perineum and vagina were inspected and 1st degree lacerations was noted.  cc. Pt tolerated procedure well, in stable condition, recovering in LDR. Infant in LDR. Instrument/sponge count correct x 2 and confirmed by nurse.  at 1043 of a live male infant with Apgars 9/9. Delivered SAMMIE, loose nuchal cord x1. Infant's head delivered with maternal expulsive efforts. Shoulders delivered without difficulty followed by the rest of the body. Cord clamped and cut after delay. Cord gas samples obtained. Baby handed to patient. Placenta delivered spontaneously, intact at 1048. Pitocin started. Cervix, perineum and vagina were inspected and 1st degree lacerations was noted and repaired with 2-0 Chromic. Excellent hemostasis achieved.  cc. Pt tolerated procedure well, in stable condition, recovering in LDR. Infant in LDR. Instrument/sponge count correct x 2 and confirmed by nurse.

## 2022-09-04 NOTE — DISCHARGE NOTE OB - CARE PROVIDER_API CALL
Ronna Nugent)  Obstetrics and Gynecology  [address] [place] [address]  Phone: [phone]  Fax: [phone]  Follow Up Time: 1 month

## 2022-09-04 NOTE — OB PROVIDER H&P - ATTENDING COMMENTS
32y  at 40.1 weeks GA who presents to L&D with SROM and reassuring fetal testing, consent for care signed after questions answered.

## 2022-09-04 NOTE — DISCHARGE NOTE OB - MATERIALS PROVIDED
[facility] [place] Screening Program/  Immunization Record/Breastfeeding Log/Breastfeeding Mother’s Support Group Information/Guide to Postpartum Care/[facility] Hearing Screen Program/Back To Sleep Handout/Shaken Baby Prevention Handout/Breastfeeding Guide and Packet/MMR Vaccination (VIS Pub Date: 2012)/Tdap Vaccination (VIS Pub Date: 2012)

## 2022-09-04 NOTE — DISCHARGE NOTE OB - MEDICATION SUMMARY - MEDICATIONS TO TAKE
I will START or STAY ON the medications listed below when I get home from the hospital:    acetaminophen 500 mg oral tablet  -- 2 tab(s) by mouth every 6 hours   -- This product contains acetaminophen.  Do not use  with any other product containing acetaminophen to prevent possible liver damage.    -- Indication: For pain    ibuprofen 600 mg oral tablet  -- 1 tab(s) by mouth every 6 hours   -- Do not take this drug if you are pregnant.  It is very important that you take or use this exactly as directed.  Do not skip doses or discontinue unless directed by your doctor.  May cause drowsiness or dizziness.  Obtain medical advice before taking any non-prescription drugs as some may affect the action of this medication.  Take with food or milk.    -- Indication: For pain    Prenatal Multivitamins with Folic Acid 1 mg oral capsule  -- 1 cap(s) by mouth once a day   -- Indication: For healthy mom and baby

## 2022-09-04 NOTE — OB PROVIDER H&P - NSHPPHYSICALEXAM_GEN_ALL_CORE
T(C): 36.9 (09-04-22 @ 03:04), Max: 36.9 (09-04-22 @ 03:04)  HR: 80 (09-04-22 @ 03:06) (80 - 80)  BP: 128/73 (09-04-22 @ 03:06) (128/73 - 128/73)    Gen: NAD, well-appearing   Abd: Soft, gravid  Ext: non-tender, non-edematous  SVE: 1/70/-3, vertex    FHT: baseline 120s, moderate variability, +accels, -decels   [place]: krishna regularly q 3 minutes

## 2022-09-04 NOTE — DISCHARGE NOTE OB - NS MD DC FALL RISK RISK
For information on Fall & Injury Prevention, visit: [email or web address] OR  [email or web address] OR  https://www.cdc.gov/steadi/patient.html

## 2022-09-05 LAB
ABO RH CONFIRMATION: SIGNIFICANT CHANGE UP
FETAL SCREEN: SIGNIFICANT CHANGE UP
HCT VFR BLD CALC: 39.7 % — SIGNIFICANT CHANGE UP (ref 34.5–45)
HGB BLD-MCNC: 13.1 G/DL — SIGNIFICANT CHANGE UP (ref 11.5–15.5)
T PALLIDUM AB TITR SER: NEGATIVE — SIGNIFICANT CHANGE UP

## 2022-09-05 RX ADMIN — Medication 975 MILLIGRAM(S): at 02:41

## 2022-09-05 RX ADMIN — Medication 600 MILLIGRAM(S): at 18:24

## 2022-09-05 RX ADMIN — Medication 975 MILLIGRAM(S): at 09:29

## 2022-09-05 RX ADMIN — Medication 975 MILLIGRAM(S): at 15:52

## 2022-09-05 RX ADMIN — Medication 975 MILLIGRAM(S): at 02:59

## 2022-09-05 RX ADMIN — Medication 600 MILLIGRAM(S): at 12:09

## 2022-09-05 RX ADMIN — Medication 1 TABLET(S): at 12:08

## 2022-09-05 NOTE — PROGRESS NOTE ADULT - ASSESSMENT
A/P: PARK WEN is a 32y  s/p   PPD1  - Stable, doing well postpartum  - Hgb 13.0 > 14.1  - Pain: well controlled on PO pain meds  - GI: Regular diet  - : voiding  - DVT prophylaxis: ambulate  - Blood type A-, baby A+, fetal screen ordered, first dose of rhogam ordered. Will give additional doses based on fetal screen  - Dispo: Possible home today with attending approval   A/P: PARK WEN is a 32y  s/p   PPD1  - Stable, doing well postpartum  - Hgb 13.0 > 14.1  - Pain: well controlled on PO pain meds  - GI: Regular diet  - : voiding  - DVT prophylaxis: ambulate  - Blood type A-, baby A+, fetal screen ordered, first dose of rhogam ordered. Will give additional doses based on fetal screen  - Dispo: Desires d/c tomorrow

## 2022-09-06 VITALS
TEMPERATURE: 98 F | SYSTOLIC BLOOD PRESSURE: 104 MMHG | DIASTOLIC BLOOD PRESSURE: 64 MMHG | RESPIRATION RATE: 16 BRPM | OXYGEN SATURATION: 99 % | HEART RATE: 54 BPM

## 2022-09-06 PROCEDURE — 85025 COMPLETE CBC W/AUTO DIFF WBC: CPT

## 2022-09-06 PROCEDURE — 59050 FETAL MONITOR W/REPORT: CPT

## 2022-09-06 PROCEDURE — 86780 TREPONEMA PALLIDUM: CPT

## 2022-09-06 PROCEDURE — U0005: CPT

## 2022-09-06 PROCEDURE — 85018 HEMOGLOBIN: CPT

## 2022-09-06 PROCEDURE — 86901 BLOOD TYPING SEROLOGIC RH(D): CPT

## 2022-09-06 PROCEDURE — U0003: CPT

## 2022-09-06 PROCEDURE — 36415 COLL VENOUS BLD VENIPUNCTURE: CPT

## 2022-09-06 PROCEDURE — 86900 BLOOD TYPING SEROLOGIC ABO: CPT

## 2022-09-06 PROCEDURE — 86850 RBC ANTIBODY SCREEN: CPT

## 2022-09-06 PROCEDURE — 90707 MMR VACCINE SC: CPT

## 2022-09-06 PROCEDURE — 59025 FETAL NON-STRESS TEST: CPT

## 2022-09-06 PROCEDURE — G0463: CPT

## 2022-09-06 PROCEDURE — 85014 HEMATOCRIT: CPT

## 2022-09-06 PROCEDURE — 85461 HEMOGLOBIN FETAL: CPT

## 2022-09-06 PROCEDURE — 86769 SARS-COV-2 COVID-19 ANTIBODY: CPT

## 2022-09-06 RX ADMIN — Medication 600 MILLIGRAM(S): at 06:00

## 2022-09-06 RX ADMIN — Medication 1 TABLET(S): at 11:36

## 2022-09-06 RX ADMIN — Medication 600 MILLIGRAM(S): at 00:12

## 2022-09-06 RX ADMIN — Medication 600 MILLIGRAM(S): at 06:02

## 2022-09-06 RX ADMIN — Medication 0.5 MILLILITER(S): at 10:35

## 2022-09-06 RX ADMIN — Medication 600 MILLIGRAM(S): at 11:36

## 2022-09-06 RX ADMIN — Medication 600 MILLIGRAM(S): at 00:13

## 2022-09-06 NOTE — PROGRESS NOTE ADULT - SUBJECTIVE AND OBJECTIVE BOX
PARK WEN is a 32y  s/p   PPD1    SUBJECTIVE:  Patient has no complaints, no acute events overnight.  Pain is well controlled with PRN pain medication.   She is ambulating, voiding, tolerating PO. Denies N/V.  Minimal lochia.      OBJECTIVE:  Physical exam:  General: AOx3, NAD.  Heart: Regular, rate, and rhythm  Lungs: CTAB  Abdomen: Soft, appropriately tender to palpitation, firm uterine fundus at umbilicus.  Vaginal: minimal blood on pad, no bleeding on palpation of fundus  Ext: No DVT signs, warm extremities.    Vital Signs Last 24 Hrs  T(C): 36.6 (05 Sep 2022 03:33), Max: 37.0 (04 Sep 2022 10:03)  T(F): 97.8 (05 Sep 2022 03:33), Max: 98.6 (04 Sep 2022 10:03)  HR: 53 (05 Sep 2022 03:33) (47 - 107)  BP: 125/82 (05 Sep 2022 03:33) (92/55 - 139/79)  BP(mean): --  RR: 17 (05 Sep 2022 03:33) (14 - 17)  SpO2: 98% (05 Sep 2022 03:33) (97% - 100%)    Parameters below as of 05 Sep 2022 03:33  Patient On (Oxygen Delivery Method): room air    LABS:                        14.1   14.27 )-----------( 164      ( 04 Sep 2022 03:40 )             41.5   
PARK WEN is a 32y  now PPD#2 s/p spontaneous vaginal delivery at 40w1d gestation, uncomplicated.    S:    No acute events overnight.   The patient has no complaints.  Pain controlled with current treatment regimen.   She is ambulating without difficulty and tolerating PO.   + flatus/-BM/+ voiding   She endorses appropriate lochia, which is decreasing.   She is breastfeeding without difficulty.   She denies fevers, chills, nausea and vomiting.   She denies lightheadedness, dizziness, palpitations, chest pain and SOB.     O:    T(C): 36.7 (22 @ 16:21), Max: 36.7 (22 @ 16:21)  HR: 64 (22 @ 16:21) (64 - 64)  BP: 112/68 (22 @ 16:21) (112/68 - 112/68)  RR: 18 (22 @ 16:21) (18 - 18)  SpO2: 96% (22 @ 16:21) (96% - 96%)    Gen: NAD, AOx3  Breast: Nontender, non-engorged   Abdomen:  Soft, non-tender, non-distended  Uterus:  Fundus firm below umbilicus  VE:  Expectant lochia  Ext:  Non-tender and non-edematous                          13.1   x     )-----------( x        ( 05 Sep 2022 05:01 )             39.7

## 2022-09-06 NOTE — PROGRESS NOTE ADULT - ASSESSMENT
PARK WEN is a 32y  now PPD#2 s/p spontaneous vaginal delivery at 40w1d gestation, uncomplicated.    -Vital signs stable  -Hgb: 13.1  -Blood type A-, baby A+, fetal screen negative, dose of rhogam ordered.   -Voiding, tolerating PO, bowel function nml   -Advance care as tolerated   -Continue routine postpartum care and education  -Healthy male infant, desires circumcision  -Dispo: Pt is stable, doing well and meeting all postpartum and postoperative milestones. Possible discharge to home today pending attending approval.

## 2024-04-10 NOTE — OB RN TRIAGE NOTE - FALL HARM RISK - TYPE OF ASSESSMENT
What Type Of Note Output Would You Prefer (Optional)?: Bullet Format
How Severe Is Your Rash?: mild
Is This A New Presentation, Or A Follow-Up?: Rash
Admission

## 2024-06-25 NOTE — OB RN DELIVERY SUMMARY - NS_FETALMONITOR_OBGYN_ALL_OB
CMP stable.
External Butternut/External FHR
full range of motion in all extremities
Acitretin Pregnancy And Lactation Text: This medication is Pregnancy Category X and should not be given to women who are pregnant or may become pregnant in the future. This medication is excreted in breast milk.

## 2024-09-30 NOTE — OB RN TRIAGE NOTE - NS_LASTFOOD_OBGYN_ALL_OB_FT
Detail Level: Detailed Add 26769 Cpt? (Important Note: In 2017 The Use Of 74020 Is Being Tracked By Cms To Determine Future Global Period Reimbursement For Global Periods): no Wound Evaluated By: Vianney Mak [employer]

## 2025-01-22 NOTE — OB RN PATIENT PROFILE - NS_PARA_OBGYN_ALL_OB_NU
1

## 2025-03-23 NOTE — OB PROVIDER TRIAGE NOTE - NSICDXPASTMEDICALHX_GEN_ALL_CORE_FT
PAST MEDICAL HISTORY:  Herniated lumbar intervertebral disc l4 l5    Vaginal delivery 2/18/2020    
Unable to Assess